# Patient Record
Sex: FEMALE | Race: WHITE | NOT HISPANIC OR LATINO | Employment: OTHER | URBAN - METROPOLITAN AREA
[De-identification: names, ages, dates, MRNs, and addresses within clinical notes are randomized per-mention and may not be internally consistent; named-entity substitution may affect disease eponyms.]

---

## 2017-01-01 ENCOUNTER — APPOINTMENT (OUTPATIENT)
Dept: GENERAL RADIOLOGY | Facility: HOSPITAL | Age: 82
End: 2017-01-01

## 2017-01-01 ENCOUNTER — APPOINTMENT (OUTPATIENT)
Dept: CT IMAGING | Facility: HOSPITAL | Age: 82
End: 2017-01-01

## 2017-01-01 ENCOUNTER — HOSPITAL ENCOUNTER (INPATIENT)
Facility: HOSPITAL | Age: 82
LOS: 1 days | End: 2017-07-20
Attending: EMERGENCY MEDICINE | Admitting: HOSPITALIST

## 2017-01-01 ENCOUNTER — HOSPITAL ENCOUNTER (INPATIENT)
Facility: HOSPITAL | Age: 82
LOS: 4 days | End: 2017-07-24
Attending: FAMILY MEDICINE | Admitting: FAMILY MEDICINE

## 2017-01-01 VITALS
TEMPERATURE: 98.1 F | HEART RATE: 81 BPM | RESPIRATION RATE: 22 BRPM | DIASTOLIC BLOOD PRESSURE: 67 MMHG | SYSTOLIC BLOOD PRESSURE: 153 MMHG

## 2017-01-01 VITALS
RESPIRATION RATE: 16 BRPM | SYSTOLIC BLOOD PRESSURE: 147 MMHG | TEMPERATURE: 97.9 F | HEART RATE: 93 BPM | WEIGHT: 113.7 LBS | DIASTOLIC BLOOD PRESSURE: 86 MMHG | OXYGEN SATURATION: 96 % | HEIGHT: 65 IN | BODY MASS INDEX: 18.94 KG/M2

## 2017-01-01 DIAGNOSIS — I63.512 ACUTE ISCHEMIC LEFT MCA STROKE (HCC): Primary | ICD-10-CM

## 2017-01-01 DIAGNOSIS — R40.2431 GLASGOW COMA SCALE TOTAL SCORE 3-8, IN THE FIELD (EMT OR AMBULANCE) (HCC): ICD-10-CM

## 2017-01-01 DIAGNOSIS — I10 UNCONTROLLED HYPERTENSION: ICD-10-CM

## 2017-01-01 LAB
ALT SERPL W P-5'-P-CCNC: 14 U/L (ref 7–40)
ANION GAP SERPL CALCULATED.3IONS-SCNC: 7 MMOL/L (ref 3–11)
APTT PPP: <24 SECONDS (ref 24–31)
ARTICHOKE IGE QN: 75 MG/DL (ref 0–130)
AST SERPL-CCNC: 22 U/L (ref 0–33)
BASOPHILS # BLD AUTO: 0.05 10*3/MM3 (ref 0–0.2)
BASOPHILS NFR BLD AUTO: 0.7 % (ref 0–1)
BUN BLD-MCNC: 27 MG/DL (ref 9–23)
BUN BLDA-MCNC: 26 MG/DL (ref 8–26)
BUN/CREAT SERPL: 19.3 (ref 7–25)
CA-I BLDA-SCNC: 1.32 MMOL/L (ref 1.2–1.32)
CALCIUM SPEC-SCNC: 9.7 MG/DL (ref 8.7–10.4)
CHLORIDE BLDA-SCNC: 107 MMOL/L (ref 98–109)
CHLORIDE SERPL-SCNC: 112 MMOL/L (ref 99–109)
CHOLEST SERPL-MCNC: 129 MG/DL (ref 0–200)
CO2 BLDA-SCNC: 24 MMOL/L (ref 24–29)
CO2 SERPL-SCNC: 24 MMOL/L (ref 20–31)
CREAT BLD-MCNC: 1.4 MG/DL (ref 0.6–1.3)
CREAT BLDA-MCNC: 1.3 MG/DL (ref 0.6–1.3)
DEPRECATED RDW RBC AUTO: 48.3 FL (ref 37–54)
DEPRECATED RDW RBC AUTO: 49 FL (ref 37–54)
EOSINOPHIL # BLD AUTO: 0.29 10*3/MM3 (ref 0–0.3)
EOSINOPHIL NFR BLD AUTO: 3.8 % (ref 0–3)
ERYTHROCYTE [DISTWIDTH] IN BLOOD BY AUTOMATED COUNT: 14 % (ref 11.3–14.5)
ERYTHROCYTE [DISTWIDTH] IN BLOOD BY AUTOMATED COUNT: 14.2 % (ref 11.3–14.5)
GFR SERPL CREATININE-BSD FRML MDRD: 35 ML/MIN/1.73
GLUCOSE BLD-MCNC: 135 MG/DL (ref 70–100)
GLUCOSE BLDC GLUCOMTR-MCNC: 158 MG/DL (ref 70–130)
GLUCOSE BLDC GLUCOMTR-MCNC: 162 MG/DL (ref 70–130)
HBA1C MFR BLD: 5.9 % (ref 4.8–5.6)
HCT VFR BLD AUTO: 36.2 % (ref 34.5–44)
HCT VFR BLD AUTO: 39.6 % (ref 34.5–44)
HCT VFR BLDA CALC: 40 % (ref 38–51)
HDLC SERPL-MCNC: 31 MG/DL (ref 40–60)
HGB BLD-MCNC: 11.8 G/DL (ref 11.5–15.5)
HGB BLD-MCNC: 12.8 G/DL (ref 11.5–15.5)
HGB BLDA-MCNC: 13.6 G/DL (ref 12–17)
HOLD SPECIMEN: NORMAL
HOLD SPECIMEN: NORMAL
IMM GRANULOCYTES # BLD: 0.01 10*3/MM3 (ref 0–0.03)
IMM GRANULOCYTES NFR BLD: 0.1 % (ref 0–0.6)
INR PPP: 1 (ref 0.8–1.2)
LYMPHOCYTES # BLD AUTO: 1.67 10*3/MM3 (ref 0.6–4.8)
LYMPHOCYTES NFR BLD AUTO: 21.9 % (ref 24–44)
MCH RBC QN AUTO: 30.5 PG (ref 27–31)
MCH RBC QN AUTO: 31.5 PG (ref 27–31)
MCHC RBC AUTO-ENTMCNC: 32.3 G/DL (ref 32–36)
MCHC RBC AUTO-ENTMCNC: 32.6 G/DL (ref 32–36)
MCV RBC AUTO: 94.5 FL (ref 80–99)
MCV RBC AUTO: 96.5 FL (ref 80–99)
MONOCYTES # BLD AUTO: 0.43 10*3/MM3 (ref 0–1)
MONOCYTES NFR BLD AUTO: 5.6 % (ref 0–12)
NEUTROPHILS # BLD AUTO: 5.19 10*3/MM3 (ref 1.5–8.3)
NEUTROPHILS NFR BLD AUTO: 67.9 % (ref 41–71)
PLATELET # BLD AUTO: 293 10*3/MM3 (ref 150–450)
PLATELET # BLD AUTO: 336 10*3/MM3 (ref 150–450)
PMV BLD AUTO: 11.1 FL (ref 6–12)
PMV BLD AUTO: 11.2 FL (ref 6–12)
POTASSIUM BLD-SCNC: 4.9 MMOL/L (ref 3.5–5.5)
POTASSIUM BLDA-SCNC: 5.3 MMOL/L (ref 3.5–4.9)
PROTHROMBIN TIME: 11.7 SECONDS (ref 12.8–15.2)
RBC # BLD AUTO: 3.75 10*6/MM3 (ref 3.89–5.14)
RBC # BLD AUTO: 4.19 10*6/MM3 (ref 3.89–5.14)
SODIUM BLD-SCNC: 143 MMOL/L (ref 132–146)
SODIUM BLDA-SCNC: 143 MMOL/L (ref 138–146)
TRIGL SERPL-MCNC: 124 MG/DL (ref 0–150)
TROPONIN I SERPL-MCNC: 0.02 NG/ML (ref 0–0.07)
WBC NRBC COR # BLD: 7.64 10*3/MM3 (ref 3.5–10.8)
WBC NRBC COR # BLD: 9.16 10*3/MM3 (ref 3.5–10.8)
WHOLE BLOOD HOLD SPECIMEN: NORMAL
WHOLE BLOOD HOLD SPECIMEN: NORMAL

## 2017-01-01 PROCEDURE — 85730 THROMBOPLASTIN TIME PARTIAL: CPT | Performed by: EMERGENCY MEDICINE

## 2017-01-01 PROCEDURE — 0 IOPAMIDOL PER 1 ML: Performed by: EMERGENCY MEDICINE

## 2017-01-01 PROCEDURE — 25010000002 MORPHINE SULFATE (PF) 2 MG/ML SOLUTION: Performed by: INTERNAL MEDICINE

## 2017-01-01 PROCEDURE — 99223 1ST HOSP IP/OBS HIGH 75: CPT | Performed by: HOSPITALIST

## 2017-01-01 PROCEDURE — 99285 EMERGENCY DEPT VISIT HI MDM: CPT

## 2017-01-01 PROCEDURE — 70450 CT HEAD/BRAIN W/O DYE: CPT

## 2017-01-01 PROCEDURE — 99231 SBSQ HOSP IP/OBS SF/LOW 25: CPT | Performed by: PSYCHIATRY & NEUROLOGY

## 2017-01-01 PROCEDURE — 84450 TRANSFERASE (AST) (SGOT): CPT | Performed by: EMERGENCY MEDICINE

## 2017-01-01 PROCEDURE — G0378 HOSPITAL OBSERVATION PER HR: HCPCS

## 2017-01-01 PROCEDURE — 85027 COMPLETE CBC AUTOMATED: CPT | Performed by: NURSE PRACTITIONER

## 2017-01-01 PROCEDURE — 84484 ASSAY OF TROPONIN QUANT: CPT

## 2017-01-01 PROCEDURE — 99221 1ST HOSP IP/OBS SF/LOW 40: CPT | Performed by: NURSE PRACTITIONER

## 2017-01-01 PROCEDURE — 85025 COMPLETE CBC W/AUTO DIFF WBC: CPT | Performed by: EMERGENCY MEDICINE

## 2017-01-01 PROCEDURE — 85014 HEMATOCRIT: CPT

## 2017-01-01 PROCEDURE — 99223 1ST HOSP IP/OBS HIGH 75: CPT | Performed by: PSYCHIATRY & NEUROLOGY

## 2017-01-01 PROCEDURE — 99239 HOSP IP/OBS DSCHRG MGMT >30: CPT | Performed by: INTERNAL MEDICINE

## 2017-01-01 PROCEDURE — 0042T HC CT CEREBRAL PERFUSION W/WO CONTRAST: CPT

## 2017-01-01 PROCEDURE — 80047 BASIC METABLC PNL IONIZED CA: CPT

## 2017-01-01 PROCEDURE — 80061 LIPID PANEL: CPT | Performed by: NURSE PRACTITIONER

## 2017-01-01 PROCEDURE — 80048 BASIC METABOLIC PNL TOTAL CA: CPT | Performed by: NURSE PRACTITIONER

## 2017-01-01 PROCEDURE — 93005 ELECTROCARDIOGRAM TRACING: CPT | Performed by: EMERGENCY MEDICINE

## 2017-01-01 PROCEDURE — 70496 CT ANGIOGRAPHY HEAD: CPT

## 2017-01-01 PROCEDURE — 83036 HEMOGLOBIN GLYCOSYLATED A1C: CPT | Performed by: NURSE PRACTITIONER

## 2017-01-01 PROCEDURE — 85610 PROTHROMBIN TIME: CPT

## 2017-01-01 PROCEDURE — 82962 GLUCOSE BLOOD TEST: CPT

## 2017-01-01 PROCEDURE — 71010 HC CHEST PA OR AP: CPT

## 2017-01-01 PROCEDURE — 84460 ALANINE AMINO (ALT) (SGPT): CPT | Performed by: EMERGENCY MEDICINE

## 2017-01-01 PROCEDURE — 70498 CT ANGIOGRAPHY NECK: CPT

## 2017-01-01 RX ORDER — MORPHINE SULFATE 2 MG/ML
2 INJECTION, SOLUTION INTRAMUSCULAR; INTRAVENOUS
Status: DISCONTINUED | OUTPATIENT
Start: 2017-01-01 | End: 2017-01-01 | Stop reason: HOSPADM

## 2017-01-01 RX ORDER — GLYCOPYRROLATE 0.2 MG/ML
0.4 INJECTION INTRAMUSCULAR; INTRAVENOUS EVERY 4 HOURS
Status: DISCONTINUED | OUTPATIENT
Start: 2017-01-01 | End: 2017-01-01 | Stop reason: HOSPADM

## 2017-01-01 RX ORDER — GLYCOPYRROLATE 0.2 MG/ML
0.4 INJECTION INTRAMUSCULAR; INTRAVENOUS EVERY 8 HOURS
Status: DISCONTINUED | OUTPATIENT
Start: 2017-01-01 | End: 2017-01-01

## 2017-01-01 RX ORDER — MINERAL OIL AND WHITE PETROLATUM 150; 830 MG/G; MG/G
OINTMENT OPHTHALMIC 3 TIMES DAILY
Status: CANCELLED | OUTPATIENT
Start: 2017-01-01

## 2017-01-01 RX ORDER — LORAZEPAM 2 MG/ML
2 INJECTION INTRAMUSCULAR
Status: CANCELLED | OUTPATIENT
Start: 2017-01-01 | End: 2017-07-30

## 2017-01-01 RX ORDER — LEVOTHYROXINE SODIUM 175 UG/1
175 TABLET ORAL
COMMUNITY

## 2017-01-01 RX ORDER — ATROPINE SULFATE 10 MG/ML
2 SOLUTION/ DROPS OPHTHALMIC
Status: DISCONTINUED | OUTPATIENT
Start: 2017-01-01 | End: 2017-01-01 | Stop reason: HOSPADM

## 2017-01-01 RX ORDER — ACETAMINOPHEN 500 MG
500 TABLET ORAL EVERY 6 HOURS PRN
COMMUNITY

## 2017-01-01 RX ORDER — ACETAMINOPHEN 650 MG/1
650 SUPPOSITORY RECTAL EVERY 4 HOURS PRN
Status: DISCONTINUED | OUTPATIENT
Start: 2017-01-01 | End: 2017-01-01 | Stop reason: HOSPADM

## 2017-01-01 RX ORDER — SODIUM CHLORIDE 0.9 % (FLUSH) 0.9 %
10 SYRINGE (ML) INJECTION AS NEEDED
Status: CANCELLED | OUTPATIENT
Start: 2017-01-01

## 2017-01-01 RX ORDER — ONDANSETRON 4 MG/1
4 TABLET, FILM COATED ORAL EVERY 6 HOURS PRN
COMMUNITY

## 2017-01-01 RX ORDER — SOTALOL HYDROCHLORIDE 80 MG/1
40 TABLET ORAL 2 TIMES DAILY
COMMUNITY
End: 2017-01-01

## 2017-01-01 RX ORDER — SODIUM CHLORIDE 0.9 % (FLUSH) 0.9 %
10 SYRINGE (ML) INJECTION AS NEEDED
Status: DISCONTINUED | OUTPATIENT
Start: 2017-01-01 | End: 2017-01-01 | Stop reason: HOSPADM

## 2017-01-01 RX ORDER — MINERAL OIL AND WHITE PETROLATUM 150; 830 MG/G; MG/G
OINTMENT OPHTHALMIC 3 TIMES DAILY
Status: DISCONTINUED | OUTPATIENT
Start: 2017-01-01 | End: 2017-01-01 | Stop reason: HOSPADM

## 2017-01-01 RX ORDER — GLYCOPYRROLATE 0.2 MG/ML
0.4 INJECTION INTRAMUSCULAR; INTRAVENOUS EVERY 6 HOURS PRN
Status: DISCONTINUED | OUTPATIENT
Start: 2017-01-01 | End: 2017-01-01 | Stop reason: HOSPADM

## 2017-01-01 RX ORDER — SODIUM CHLORIDE 0.9 % (FLUSH) 0.9 %
1-10 SYRINGE (ML) INJECTION AS NEEDED
Status: DISCONTINUED | OUTPATIENT
Start: 2017-01-01 | End: 2017-01-01 | Stop reason: HOSPADM

## 2017-01-01 RX ORDER — MORPHINE SULFATE 2 MG/ML
2 INJECTION, SOLUTION INTRAMUSCULAR; INTRAVENOUS
Status: CANCELLED | OUTPATIENT
Start: 2017-01-01 | End: 2017-07-29

## 2017-01-01 RX ORDER — POTASSIUM CHLORIDE 750 MG/1
20 CAPSULE, EXTENDED RELEASE ORAL DAILY
COMMUNITY

## 2017-01-01 RX ORDER — IPRATROPIUM BROMIDE AND ALBUTEROL SULFATE 2.5; .5 MG/3ML; MG/3ML
3 SOLUTION RESPIRATORY (INHALATION) EVERY 6 HOURS PRN
COMMUNITY

## 2017-01-01 RX ORDER — ATORVASTATIN CALCIUM 40 MG/1
40 TABLET, FILM COATED ORAL NIGHTLY
COMMUNITY

## 2017-01-01 RX ORDER — GLYCOPYRROLATE 0.2 MG/ML
0.2 INJECTION INTRAMUSCULAR; INTRAVENOUS EVERY 4 HOURS PRN
Status: DISCONTINUED | OUTPATIENT
Start: 2017-01-01 | End: 2017-01-01

## 2017-01-01 RX ORDER — SCOLOPAMINE TRANSDERMAL SYSTEM 1 MG/1
1 PATCH, EXTENDED RELEASE TRANSDERMAL
Status: DISCONTINUED | OUTPATIENT
Start: 2017-01-01 | End: 2017-01-01 | Stop reason: HOSPADM

## 2017-01-01 RX ORDER — LORAZEPAM 2 MG/ML
2 INJECTION INTRAMUSCULAR
Status: DISCONTINUED | OUTPATIENT
Start: 2017-01-01 | End: 2017-01-01 | Stop reason: HOSPADM

## 2017-01-01 RX ORDER — BUSPIRONE HYDROCHLORIDE 5 MG/1
5 TABLET ORAL EVERY MORNING
COMMUNITY

## 2017-01-01 RX ORDER — GLYCOPYRROLATE 0.2 MG/ML
0.4 INJECTION INTRAMUSCULAR; INTRAVENOUS EVERY 8 HOURS
Status: DISCONTINUED | OUTPATIENT
Start: 2017-01-01 | End: 2017-01-01 | Stop reason: HOSPADM

## 2017-01-01 RX ORDER — LISINOPRIL 10 MG/1
10 TABLET ORAL 2 TIMES DAILY
COMMUNITY

## 2017-01-01 RX ORDER — SODIUM CHLORIDE 0.9 % (FLUSH) 0.9 %
1-10 SYRINGE (ML) INJECTION AS NEEDED
Status: CANCELLED | OUTPATIENT
Start: 2017-01-01

## 2017-01-01 RX ORDER — LORAZEPAM 2 MG/ML
0.5 INJECTION INTRAMUSCULAR EVERY 4 HOURS PRN
Status: DISCONTINUED | OUTPATIENT
Start: 2017-01-01 | End: 2017-01-01 | Stop reason: HOSPADM

## 2017-01-01 RX ORDER — FAMOTIDINE 20 MG/1
20 TABLET, FILM COATED ORAL NIGHTLY
COMMUNITY

## 2017-01-01 RX ORDER — GLYCOPYRROLATE 0.2 MG/ML
0.4 INJECTION INTRAMUSCULAR; INTRAVENOUS EVERY 8 HOURS
Status: CANCELLED | OUTPATIENT
Start: 2017-01-01

## 2017-01-01 RX ORDER — BISACODYL 10 MG
10 SUPPOSITORY, RECTAL RECTAL ONCE AS NEEDED
COMMUNITY

## 2017-01-01 RX ORDER — BISACODYL 10 MG
10 SUPPOSITORY, RECTAL RECTAL DAILY PRN
Status: DISCONTINUED | OUTPATIENT
Start: 2017-01-01 | End: 2017-01-01 | Stop reason: HOSPADM

## 2017-01-01 RX ORDER — SOTALOL HYDROCHLORIDE 80 MG/1
40 TABLET ORAL 2 TIMES DAILY
COMMUNITY

## 2017-01-01 RX ORDER — LORAZEPAM 2 MG/ML
0.5 INJECTION INTRAMUSCULAR EVERY 4 HOURS PRN
Status: CANCELLED | OUTPATIENT
Start: 2017-01-01 | End: 2017-07-29

## 2017-01-01 RX ORDER — ACETAMINOPHEN 650 MG/1
650 SUPPOSITORY RECTAL EVERY 4 HOURS PRN
Status: CANCELLED | OUTPATIENT
Start: 2017-01-01

## 2017-01-01 RX ORDER — GLYCOPYRROLATE 0.2 MG/ML
0.4 INJECTION INTRAMUSCULAR; INTRAVENOUS EVERY 6 HOURS PRN
Status: CANCELLED | OUTPATIENT
Start: 2017-01-01

## 2017-01-01 RX ORDER — LANOLIN ALCOHOL/MO/W.PET/CERES
1.5 CREAM (GRAM) TOPICAL NIGHTLY PRN
COMMUNITY

## 2017-01-01 RX ADMIN — MINERAL OIL AND WHITE PETROLATUM: 150; 830 OINTMENT OPHTHALMIC at 20:34

## 2017-01-01 RX ADMIN — Medication 3 ML: at 17:11

## 2017-01-01 RX ADMIN — GLYCOPYRROLATE 0.4 MG: 0.2 INJECTION, SOLUTION INTRAMUSCULAR; INTRAVENOUS at 17:10

## 2017-01-01 RX ADMIN — GLYCOPYRROLATE 0.4 MG: 0.2 INJECTION, SOLUTION INTRAMUSCULAR; INTRAVENOUS at 12:37

## 2017-01-01 RX ADMIN — MINERAL OIL AND WHITE PETROLATUM: 150; 830 OINTMENT OPHTHALMIC at 21:01

## 2017-01-01 RX ADMIN — GLYCOPYRROLATE 0.4 MG: 0.2 INJECTION, SOLUTION INTRAMUSCULAR; INTRAVENOUS at 17:56

## 2017-01-01 RX ADMIN — SCOPALAMINE 1 PATCH: 1 PATCH, EXTENDED RELEASE TRANSDERMAL at 12:37

## 2017-01-01 RX ADMIN — IOPAMIDOL 125 ML: 755 INJECTION, SOLUTION INTRAVENOUS at 13:09

## 2017-01-01 RX ADMIN — MINERAL OIL AND WHITE PETROLATUM: 150; 830 OINTMENT OPHTHALMIC at 17:10

## 2017-01-01 RX ADMIN — GLYCOPYRROLATE 0.4 MG: 0.2 INJECTION, SOLUTION INTRAMUSCULAR; INTRAVENOUS at 05:20

## 2017-01-01 RX ADMIN — GLYCOPYRROLATE 0.4 MG: 0.2 INJECTION, SOLUTION INTRAMUSCULAR; INTRAVENOUS at 00:20

## 2017-01-01 RX ADMIN — Medication: at 10:03

## 2017-01-01 RX ADMIN — GLYCOPYRROLATE 0.4 MG: 0.2 INJECTION, SOLUTION INTRAMUSCULAR; INTRAVENOUS at 12:18

## 2017-01-01 RX ADMIN — GLYCOPYRROLATE 0.2 MG: 0.2 INJECTION, SOLUTION INTRAMUSCULAR; INTRAVENOUS at 08:36

## 2017-01-01 RX ADMIN — MINERAL OIL AND WHITE PETROLATUM: 150; 830 OINTMENT OPHTHALMIC at 10:03

## 2017-01-01 RX ADMIN — MINERAL OIL AND WHITE PETROLATUM: 150; 830 OINTMENT OPHTHALMIC at 09:36

## 2017-01-01 RX ADMIN — GLYCOPYRROLATE 0.4 MG: 0.2 INJECTION, SOLUTION INTRAMUSCULAR; INTRAVENOUS at 22:31

## 2017-01-01 RX ADMIN — MINERAL OIL AND WHITE PETROLATUM: 150; 830 OINTMENT OPHTHALMIC at 17:57

## 2017-01-01 RX ADMIN — GLYCOPYRROLATE 0.4 MG: 0.2 INJECTION, SOLUTION INTRAMUSCULAR; INTRAVENOUS at 20:33

## 2017-01-01 RX ADMIN — GLYCOPYRROLATE 0.4 MG: 0.2 INJECTION, SOLUTION INTRAMUSCULAR; INTRAVENOUS at 08:24

## 2017-01-01 RX ADMIN — MINERAL OIL AND WHITE PETROLATUM: 150; 830 OINTMENT OPHTHALMIC at 09:34

## 2017-01-01 RX ADMIN — GLYCOPYRROLATE 0.4 MG: 0.2 INJECTION, SOLUTION INTRAMUSCULAR; INTRAVENOUS at 16:20

## 2017-01-01 RX ADMIN — GLYCOPYRROLATE 0.4 MG: 0.2 INJECTION, SOLUTION INTRAMUSCULAR; INTRAVENOUS at 21:01

## 2017-01-01 RX ADMIN — Medication: at 17:57

## 2017-01-01 RX ADMIN — GLYCOPYRROLATE 0.4 MG: 0.2 INJECTION, SOLUTION INTRAMUSCULAR; INTRAVENOUS at 04:51

## 2017-01-01 RX ADMIN — GLYCOPYRROLATE 0.2 MG: 0.2 INJECTION, SOLUTION INTRAMUSCULAR; INTRAVENOUS at 03:13

## 2017-01-01 RX ADMIN — GLYCOPYRROLATE 0.4 MG: 0.2 INJECTION, SOLUTION INTRAMUSCULAR; INTRAVENOUS at 05:07

## 2017-01-01 RX ADMIN — GLYCOPYRROLATE 0.4 MG: 0.2 INJECTION, SOLUTION INTRAMUSCULAR; INTRAVENOUS at 00:28

## 2017-01-01 RX ADMIN — MORPHINE SULFATE 2 MG: 2 INJECTION, SOLUTION INTRAMUSCULAR; INTRAVENOUS at 02:04

## 2017-01-01 RX ADMIN — GLYCOPYRROLATE 0.4 MG: 0.2 INJECTION, SOLUTION INTRAMUSCULAR; INTRAVENOUS at 09:34

## 2017-01-01 RX ADMIN — GLYCOPYRROLATE 0.4 MG: 0.2 INJECTION, SOLUTION INTRAMUSCULAR; INTRAVENOUS at 01:01

## 2017-01-01 RX ADMIN — MINERAL OIL AND WHITE PETROLATUM: 150; 830 OINTMENT OPHTHALMIC at 20:33

## 2017-01-01 RX ADMIN — MORPHINE SULFATE 2 MG: 2 INJECTION, SOLUTION INTRAMUSCULAR; INTRAVENOUS at 22:35

## 2017-01-01 RX ADMIN — MINERAL OIL AND WHITE PETROLATUM: 150; 830 OINTMENT OPHTHALMIC at 14:50

## 2017-01-01 RX ADMIN — MINERAL OIL AND WHITE PETROLATUM 1 APPLICATION: 150; 830 OINTMENT OPHTHALMIC at 16:20

## 2017-01-01 RX ADMIN — GLYCOPYRROLATE 0.4 MG: 0.2 INJECTION, SOLUTION INTRAMUSCULAR; INTRAVENOUS at 14:50

## 2017-01-01 RX ADMIN — MINERAL OIL AND WHITE PETROLATUM: 150; 830 OINTMENT OPHTHALMIC at 20:29

## 2017-01-01 RX ADMIN — GLYCOPYRROLATE 0.4 MG: 0.2 INJECTION, SOLUTION INTRAMUSCULAR; INTRAVENOUS at 20:34

## 2017-01-01 RX ADMIN — GLYCOPYRROLATE 0.4 MG: 0.2 INJECTION, SOLUTION INTRAMUSCULAR; INTRAVENOUS at 21:33

## 2017-01-01 RX ADMIN — GLYCOPYRROLATE 0.4 MG: 0.2 INJECTION, SOLUTION INTRAMUSCULAR; INTRAVENOUS at 12:59

## 2017-01-01 RX ADMIN — MORPHINE SULFATE 2 MG: 2 INJECTION, SOLUTION INTRAMUSCULAR; INTRAVENOUS at 00:20

## 2017-07-19 PROBLEM — I63.512 ACUTE ISCHEMIC LEFT MCA STROKE (HCC): Status: ACTIVE | Noted: 2017-01-01

## 2017-07-19 PROBLEM — F03.90 DEMENTIA (HCC): Status: ACTIVE | Noted: 2017-01-01

## 2017-07-19 NOTE — ED PROVIDER NOTES
Subjective   HPI Comments: The patient was brought to the ED by EMS after nursing home staff noted severe altered mental status and unresponsive state. Nursing home staff note that the patient was unable to swallow pills at 0900 this morning which is abnormal for her. Her last knwn normal is some time last night, however, there is no exact time at this point. EMS report that the patient has been flaccid throughout her entire body with the exception of her left upper extremity, which demostrates decorticate-like spasms  There is a reported prior CVA.     Patient is a 89 y.o. female presenting with altered mental status.   History provided by:  EMS personnel and nursing home  History limited by:  Mental status change  Altered Mental Status   Presenting symptoms: behavior changes and unresponsiveness    Severity:  Severe  Most recent episode:  Today  Episode history:  Unable to specify  Timing:  Constant  Progression:  Worsening  Chronicity:  New  Context: nursing home resident    Associated symptoms: weakness (diffuse with excption of LUE)        Review of Systems   Unable to perform ROS: Mental status change   HENT: Positive for trouble swallowing (unable to swallow pills this morning at 0900).    Neurological: Positive for weakness (diffuse with excption of LUE).        Unresponsive state and severe altered mental status       Past Medical History:   Diagnosis Date   • Anxiety    • Arthritis    • Atrial fibrillation    • Dementia    • Depression    • Disease of thyroid gland    • Hyperlipidemia    • Hypertension    • Stroke    • TIA (transient ischemic attack)        No Known Allergies    History reviewed. No pertinent surgical history.    History reviewed. No pertinent family history.    Social History     Social History   • Marital status:      Spouse name: N/A   • Number of children: N/A   • Years of education: N/A     Social History Main Topics   • Smoking status: Never Smoker   • Smokeless tobacco: None    • Alcohol use No   • Drug use: No   • Sexual activity: Defer     Other Topics Concern   • None     Social History Narrative   • None         Objective   Physical Exam   Constitutional: She appears well-developed and well-nourished. No distress.   Severe altered mental status   HENT:   Head: Atraumatic.   Nose: Nose normal.   Mouth/Throat: Oropharynx is clear and moist.   Eyes: No scleral icterus.   Eyes closed except in response to moving the pt as well as deep painful stimulus. Pts eyes do not cross midline to the right. Left pupil 2mm, right pupil 3mm.   Neck: Neck supple.   Cardiovascular: Normal rate, regular rhythm and normal heart sounds.    No murmur heard.  Pulmonary/Chest: Effort normal. No respiratory distress. She has rales (Course rhonchi in all lung fields anteriorly.).   Abdominal: Soft. Bowel sounds are normal. She exhibits no mass. There is no tenderness. There is no rebound and no guarding.   Musculoskeletal: She exhibits no edema (no peripheral edema).   No movements noted in lower extremities.   Neurological:   Deep painful stimulis leads to decorticate-like posturing, left greater than right. Nonverbal, not follwing any commands.   Skin: Skin is warm and dry.   Nursing note and vitals reviewed.      Critical Care  Performed by: KEELY MONTAÑO  Authorized by: KEELY MONTAÑO   Total critical care time: 35 minutes  Critical care time was exclusive of separately billable procedures and treating other patients.  Critical care was necessary to treat or prevent imminent or life-threatening deterioration of the following conditions: circulatory failure and CNS failure or compromise.  Critical care was time spent personally by me on the following activities: discussions with consultants, evaluation of patient's response to treatment, obtaining history from patient or surrogate, ordering and review of laboratory studies, pulse oximetry, re-evaluation of patient's condition, ordering and review of  radiographic studies, ordering and performing treatments and interventions, examination of patient, development of treatment plan with patient or surrogate and review of old charts.               ED Course  ED Course   Comment By Time   Spoke with José Miguel Recio, pt's son, he wants to make sure that there will be no aggressive measures and requests a DNR. His phone number is [phone] Cyndi Oh 07/19 1337   Discussed case in detail with Dr. Lambert -YENI Oh 07/19 1346   Paged hospitalist, Dr. Rasheed, and intensivist, Dr. Barreto, on call -YENI Oh 07/19 1355   Spoke with Dr. Rasheed, hospital on call. Discussed acute MCA CVA and coma. Pt's son agrees with admission for observation but does not want any aggressive measures. Current blood pressure is 197/80. All are agreeable with the plan to admit. -YENI Hanna Oh 07/19 1411   Spoke with Loida Hemal, discussed presence of very large, acute blood clot on the right side affecting the left side of the body. Loida agrees with non-aggressive measures. She would like to wait for her other siblings to arrive in the ED prior to making a medica decision moving forward. -MAS Cyndi Oh 07/19 1441     Recent Results (from the past 24 hour(s))   POC Protime / INR    Collection Time: 07/19/17 12:42 PM   Result Value Ref Range    Protime 11.7 (L) 12.8 - 15.2 seconds    INR 1.0 0.8 - 1.2   aPTT    Collection Time: 07/19/17 12:44 PM   Result Value Ref Range    PTT <24.0 (L) 24.0 - 31.0 seconds   AST    Collection Time: 07/19/17 12:44 PM   Result Value Ref Range    AST (SGOT) 22 0 - 33 U/L   ALT    Collection Time: 07/19/17 12:44 PM   Result Value Ref Range    ALT (SGPT) 14 7 - 40 U/L   Light Blue Top    Collection Time: 07/19/17 12:44 PM   Result Value Ref Range    Extra Tube hold for add-on    Green Top (Gel)    Collection Time: 07/19/17 12:44 PM   Result Value Ref Range    Extra Tube Hold for add-ons.    Lavender Top    Collection Time:  07/19/17 12:44 PM   Result Value Ref Range    Extra Tube hold for add-on    Gold Top - SST    Collection Time: 07/19/17 12:44 PM   Result Value Ref Range    Extra Tube Hold for add-ons.    CBC Auto Differential    Collection Time: 07/19/17 12:44 PM   Result Value Ref Range    WBC 7.64 3.50 - 10.80 10*3/mm3    RBC 4.19 3.89 - 5.14 10*6/mm3    Hemoglobin 12.8 11.5 - 15.5 g/dL    Hematocrit 39.6 34.5 - 44.0 %    MCV 94.5 80.0 - 99.0 fL    MCH 30.5 27.0 - 31.0 pg    MCHC 32.3 32.0 - 36.0 g/dL    RDW 14.0 11.3 - 14.5 %    RDW-SD 48.3 37.0 - 54.0 fl    MPV 11.1 6.0 - 12.0 fL    Platelets 336 150 - 450 10*3/mm3    Neutrophil % 67.9 41.0 - 71.0 %    Lymphocyte % 21.9 (L) 24.0 - 44.0 %    Monocyte % 5.6 0.0 - 12.0 %    Eosinophil % 3.8 (H) 0.0 - 3.0 %    Basophil % 0.7 0.0 - 1.0 %    Immature Grans % 0.1 0.0 - 0.6 %    Neutrophils, Absolute 5.19 1.50 - 8.30 10*3/mm3    Lymphocytes, Absolute 1.67 0.60 - 4.80 10*3/mm3    Monocytes, Absolute 0.43 0.00 - 1.00 10*3/mm3    Eosinophils, Absolute 0.29 0.00 - 0.30 10*3/mm3    Basophils, Absolute 0.05 0.00 - 0.20 10*3/mm3    Immature Grans, Absolute 0.01 0.00 - 0.03 10*3/mm3   POC CHEM 8    Collection Time: 07/19/17 12:46 PM   Result Value Ref Range    Glucose 162 (H) 70 - 130 mg/dL    BUN, Arterial 26 8 - 26 mg/dL    Creatinine 1.30 0.60 - 1.30 mg/dL    Sodium 143 138 - 146 mmol/L    Potassium 5.3 (H) 3.5 - 4.9 mmol/L    Chloride 107 98 - 109 mmol/L    Total CO2 24 24 - 29 mmol/L    Hemoglobin 13.6 12.0 - 17.0 g/dL    Hematocrit 40 38 - 51 %    Ionized Calcium 1.32 1.20 - 1.32 mmol/L   POC Troponin, Rapid    Collection Time: 07/19/17 12:50 PM   Result Value Ref Range    Troponin I 0.02 0.00 - 0.07 ng/mL     Note: In addition to lab results from this visit, the labs listed above may include labs taken at another facility or during a different encounter within the last 24 hours. Please correlate lab times with ED admission and discharge times for further clarification of the services  performed during this visit.    CT Angiogram Neck With & Without Contrast   Preliminary Result   No evidence of hemodynamically significant carotid or   vertebral stenosis in the neck.       D:  07/19/2017   E:  07/19/2017          CT Angiogram Head With & Without Contrast   Preliminary Result   Truncated left middle cerebral artery, and marked paucity of   left MCA territory vascularity.       D:  07/19/2017   E:  07/19/2017          CT Cerebral Perfusion With & Without Contrast   Preliminary Result   1. Old right MCA territory infarct.   2. Large acute left MCA territory infarct. Little evidence to suggest   significant recoverable tissue.       D:  07/19/2017   E:  07/19/2017          CT Head Without Contrast Stroke Protocol    (Results Pending)   XR Chest 1 View    (Results Pending)     Vitals:    07/19/17 1515 07/19/17 1530 07/19/17 1545 07/19/17 1600   BP: 179/92 174/84 178/96 (!) 174/116   BP Location:       Patient Position:       Pulse: 66 69 68 69   Resp:       Temp:       TempSrc:       SpO2: 96% 98% 98% 96%   Weight:       Height:         Medications   sodium chloride 0.9 % flush 10 mL (not administered)   iopamidol (ISOVUE-370) 76 % injection 150 mL (125 mL Intravenous Given 7/19/17 1309)     ECG/EMG Results (last 24 hours)     Procedure Component Value Units Date/Time    ECG 12 Lead [[id number]] Collected:  07/19/17 1311     Updated:  07/19/17 1316                       MDM  Number of Diagnoses or Management Options     Amount and/or Complexity of Data Reviewed  Clinical lab tests: reviewed  Tests in the radiology section of CPT®: reviewed  Tests in the medicine section of CPT®: reviewed    Critical Care  Total time providing critical care: 30-74 minutes      Final diagnoses:   Acute ischemic left MCA stroke   Jenny coma scale total score 3-8, in the field (EMT or ambulance)       Documentation assistance provided by ramya Oh.  Information recorded by the ramya was done at my direction  and has been verified and validated by me.     Cyndi Oh  07/19/17 1330       Cyndi Oh  07/19/17 [id number]       José Miguel Barragan MD  07/19/17 [id number]

## 2017-07-19 NOTE — SIGNIFICANT NOTE
Dr. Tram Hernandez, RN, PN  Comfort Care Only, Allow natural death, make sure we address symptoms and keep her comfortable.

## 2017-07-19 NOTE — CONSULTS
NAME: COLUMBA CONTI  : 1925  PCP: Silvestre Coleman MD  Attending MD: Kalee ORTEGA MD    Date of Admission:  2017  Date of Service: 2017     CC: Stroke    History of Present Illness:  89 y.o.  female brought to the ED by EMS after nursing home staff noted severe altered mental status and unresponsive state. Nursing home staff note that the patient was unable to swallow pills at 0900 this morning which is abnormal for her. Her last knwn normal is some time last night, however, there is no exact time at this point. EMS report that the patient has been flaccid throughout her entire body with the exception of her left upper extremity, which demostrates decorticate-like spasms  There is a reported prior CVA as well as PMH of hypertension, hyperlipidemia, dementia, as well as paroxysmal Atrial fibrillation.  The stroke 2 years ago affected the right MCa distribution and left the patient with residual spastic left hemiparesis.   The patient's family expressed that she is DNR and wished no aggressive measures and to focus on comfort only.    Past Medical History:  Past Medical History:   Diagnosis Date   • Anxiety    • Arthritis    • Atrial fibrillation    • Dementia    • Depression    • Disease of thyroid gland    • Hyperlipidemia    • Hypertension    • Stroke    • TIA (transient ischemic attack)        Past Surgical History:  History reviewed. No pertinent surgical history.      Medications    (Not in a hospital admission)    Allergies:  No Known Allergies    Social Hx:  Social History     Social History   • Marital status:      Spouse name: N/A   • Number of children: N/A   • Years of education: N/A     Occupational History   • Not on file.     Social History Main Topics   • Smoking status: Never Smoker   • Smokeless tobacco: Not on file   • Alcohol use No   • Drug use: No   • Sexual activity: Defer     Other Topics Concern   • Not on file     Social History Narrative   • No narrative on  file       Family Hx:  History reviewed. No pertinent family history.    Review of Imaging:  CT of Head:  1.  Extensive old right MCA territory infarct and old left cerebellar  infarct.  2.  Early changes of massive acute left MCA infarct, with hyperdense  left MCA sign.  CT Perfusion:  1. Old right MCA territory infarct.  2. Large acute left MCA territory infarct. Little evidence to suggest  significant recoverable tissue.  These images were reviewed by myself as well as Dr. Gama and discussed with Dr. Barragan and the family.    Laboratory Result:  Lab Results   Component Value Date    WBC 7.64 07/19/2017    HGB 13.6 07/19/2017    HCT 40 07/19/2017    MCV 94.5 07/19/2017     07/19/2017     Lab Results   Component Value Date    GLUCOSE 208 (H) 05/31/2017    CALCIUM 9.5 05/31/2017     05/31/2017    K 4.4 05/31/2017    CO2 16.0 (L) 05/31/2017     05/31/2017    BUN 33 (H) 05/31/2017    CREATININE 1.30 07/19/2017    EGFRIFNONA 35 (L) 05/31/2017    BCR 23.6 05/31/2017    ANIONGAP 21.0 (H) 05/31/2017     Lab Results   Component Value Date    HGBA1C 6.1 (H) 01/27/2015     No results found for: CHOL  Lab Results   Component Value Date    TRIG 74 01/27/2015     Lab Results   Component Value Date    HDL 39 (L) 01/27/2015     No results found for: LDLCALC  No results found for: LDL  No results found for: HDLLDLRATIO  No components found for: CHOLHDL    Physical Examination:  Vitals:    07/19/17 1815   BP: 159/93   Pulse: 71   Resp:    Temp:    SpO2: 98%        General Appearance:   Well developed, well nourished, well groomed, alert, and cooperative.  Cardiovascular: Regular rate and rhythm. No carotid bruits    Neurological examination:  When deep painful stimuli applied patient has decorticate-like posturing, left greater than right.  She is nonverbal, does not follow any commands.    Interval: baseline  1a. Level Of Consciousness: 2-->Not alert: requires repeated stimulation to attend, or is obtunded  and requires strong or painful stimulation to make movements (not stereotyped)  1b. LOC Questions: 2-->Answers neither question correctly  1c. LOC Commands: 2-->Performs neither task correctly  2. Best Gaze: 2-->Forced deviation, or total gaze paresis not overcome by the oculocephalic maneuver  3. Visual: 2-->Complete hemianopia  4. Facial Palsy: 1-->Minor paralysis (flattened nasolabial fold, asymmetry on smiling)  5a. Motor Arm, Left: 4-->No movement  5b. Motor Arm, Right: 4-->No movement  6a. Motor Leg, Left: 3-->No effort against gravity: leg falls to bed immediately  6b. Motor Leg, Right: 3-->No effort against gravity: leg falls to bed immediately  7. Limb Ataxia: 0-->Absent  8. Sensory: 2-->Severe to total sensory loss: patient is not aware of being touched in the face, arm, and leg  9. Best Language: 3-->Mute, global aphasia: no usable speech or auditory comprehension  10. Dysarthria: 2-->Severe dysarthria: patients speech is so slurred as to be unintelligible in the absence of or out of proportion to any dysphasia, or is mute/anarthric  11. Extinction and Inattention (formerly Neglect): 2-->Profound safia-inattention/extinction more than 1 modality    Total (NIH Stroke Scale): 34        Diagnoses/Plan:    Ms. Recio is a 89 y.o. female who has suffered an acute left MCA ischemic stroke likely related to her paroxysmal atrial fib and not being anticoagulated.  The patient has requested that comfort measures with no aggressive therapy and treatment or testing be done.  Therefore we defer treatment at the families wishes.

## 2017-07-19 NOTE — H&P
[facility]  HISTORY AND PHYSICAL    Primary Care Physician: Silvestre Coleman MD    Subjective     Chief Complaint:  CVA    History of Present Illness:   Patient is a 89 year old  female with past medical history of anxiety, dementia, hypothyroidism, and hypertension.  She had a stroke 2 years ago with residual left sided hemiplegia.  She has been a resident of [facility] since then and has been unable to ambulate.  Patient was brought to [facility] ED today after nursing home staff noted patient to have severe altered mental status and unresponsiveness.  Her last known normal was some time last night.  EMS reported patient had been flaccid throughout her entire body with the exception of her left upper extremity, which demonstrated decorticate-like spasms.  History obtained from chart and family at bedside.  Evaluation in the ED showed large acute ischemic left MCA stroke.  ED physician spoke with patient's son and he wants no aggressive measures and requests DNR.  [place] out of [place] and is currently on way to hospital.  Two daughters present during assessment and exam and reiterate no aggressive measures.  Specifically they do not want MRI, carotid duplex, ECHO, swallow evaluation, tube feeding, or blood thinners.  They want to make sure patient is comfortable.  They are agreeable to Palliative consult but not ready for Hospice.  They want to see what happens over the next few days.  Patient will be admitted to the hospital medicine service.      Review of Systems   Unable to perform due to unresponsiveness.    Past Medical History:   Diagnosis Date   • Anxiety    • Arthritis    • Atrial fibrillation    • Dementia    • Depression    • Disease of thyroid gland    • Hyperlipidemia    • Hypertension    • Stroke    • TIA (transient ischemic attack)        History reviewed. No pertinent surgical history.    History reviewed. No pertinent family  "history.    Social History     Social History   • Marital status:      Spouse name: N/A   • Number of children: N/A   • Years of education: N/A     Occupational History   • Not on file.     Social History Main Topics   • Smoking status: Never Smoker   • Smokeless tobacco: Not on file   • Alcohol use No   • Drug use: No   • Sexual activity: Defer     Other Topics Concern   • Not on file     Social History Narrative   • No narrative on file       Medications:    (Not in a hospital admission)    Allergies:  No Known Allergies      Objective     Physical Exam:  Vital Signs: BP (!) 202/81  Pulse 69  Temp 97.5 °F (36.4 °C) (Oral)   Resp 16  Ht 65\" (165.1 cm)  Wt 140 lb (63.5 kg)  SpO2 97%  BMI 23.3 kg/m2  Physical Exam  Gen-no acute distress, severe altered mental status  HEENT-atraumatic, normocephalic, eyes closed, pupils gaze to left, moist mucous membranes present  CV-RRR, S1 S2 normal, no m/r/g  Resp-course rhonchi anterior chest, normal respiratory effort, breathing on room air  Abd-soft, NT, ND, +BS; no rebound or guarding  Ext-no edema or clubbing  Skin-warm, dry, no rashes or lesions noted  Neuro-responding to painful stimuli, nonverbal, not following commands, no spontaneous movement of extremities noted during exam  Psych-appropriate mood and behavior      Results Reviewed:    Results from last 7 days  Lab Units 07/19/17  1246 07/19/17  1244   WBC 10*3/mm3  --  7.64   HEMOGLOBIN g/dL  --  12.8   HEMOGLOBIN, POC g/dL 13.6  --    PLATELETS 10*3/mm3  --  336       Results from last 7 days  Lab Units 07/19/17  1246   CREATININE mg/dL 1.30       I have personally reviewed and interpreted available lab data, radiology studies and ECG obtained at time of admission.     Assessment / Plan     Problem List:   Hospital Problem List     Acute ischemic left MCA stroke          Assessment:  Patient is a 89 year old  female that is a resident of [facility] that presents with acute ischemic " left MCA stroke.  Family present and does not want aggressive measures.      Plan:    CVA  --neurology consulted  --family declines further stroke work up  --palliative care consult  --patient wishes to pursue comfort measures at this time without any aggressive measures        DVT prophylaxis:  TEDs/SCDs  Code Status:  DNR  Admission Status: Patient will be admitted to [facility] PATY Kari, FRANCIA 07/19/17 4:10 PM      Brief Attending Note       I have seen and examined the patient, performing an independent face-to-face diagnostic evaluation with plan of care reviewed and developed with the advanced practice clinician (APC).      Brief Summary Statement/HPI:   89 yo F with hx of prior CVA 2 years ago resulting in left sided hemiplegia as well as dementia and HTN presents from her nursing home due to AMS. CT head revealed a large left MCA stroke. Patient is obtunded at this time. Family members have all arrived. Neurology and Palliative Care have seen patient, and family wishes for comfort care.       Attending Physical Exam:  Temp:  [97.5 °F (36.4 °C)] 97.5 °F (36.4 °C)  Heart Rate:  [65-77] 76  Resp:  [16] 16  BP: (154-202)/() 167/72  Constitutional: no acute distress, obtunded  Eyes: eyes closed, but does appear to have left gaze preference  Neck: supple, no thyromegaly, trachea midline  Respiratory: Clear to auscultation bilaterally, nonlabored respirations   Cardiovascular: RRR, no murmurs, rubs, or gallops, palpable pedal pulses bilaterally  Gastrointestinal: Positive bowel sounds, soft, nontender, nondistended  Musculoskeletal: No bilateral ankle edema, no clubbing or cyanosis to bilateral lower extremities  Psychiatric: obtunded  Neurologic: RUE no movement, RLE she is moving away from my touch, left sided hemiplegia present from old stroke      Brief Assessment/Plan:  89 yo F presents with AMS and found to have a large acute left MCA stroke.     PLAN:  --Appreciate Neurology evaluation, mechanism  may be embolic due to hx of PAF not on AC due to fall risk. Due to size of the infarct, she appears to have low chance for recovery and as such a poor prognosis. Allow permissive HTN for now.  --Palliative Care following, family familiar with their services as they were involved in their father's care previously. Comfort measures only. Possible transition to hospice care in next few days if no improvement.  --Discussed at length with family and they are comfortable with the plan of care.     See above for further detailed assessment and plan developed with [facility] which I have reviewed and/or edited.    I believe this patient meets INPATIENT status due to the need for care which can only be reasonably provided in an hospital setting such as aggressive/expedited ancillary services and/or consultation services and the necessity for IV medications, close physician monitoring and/or the possible need for procedures.  In such, I feel patient’s risk for adverse outcomes and need for care warrant INPATIENT evaluation and predict the patient’s care encounter to likely last beyond 2 midnights.        Kalee Rasheed MD  07/19/17  8:42 PM

## 2017-07-19 NOTE — SIGNIFICANT NOTE
07/19/17 1444   SLP Deferred Reason   SLP Deferred Reason Unable to evaluate, medical status change  (Pt currently unresponsive and not appropriate for dysphagia evaluation. SLP will follow up tomorrow per stroke protocol as pt becomes appropriate to participate.  )

## 2017-07-19 NOTE — SIGNIFICANT NOTE
Palliative Note:  Full consult to follow tomorrow.    Pt presented to ER from [facility] this AM.  She was transferred due to mental status changes.    Discussed by phone with son, José Miguel.  He and family desire comfort care.  No IVFs, no TFs, no CPR, no intubation.    Code status updated in computer.     Comfort medications entered:  Robinul  Lorazepam  morphine

## 2017-07-19 NOTE — NURSING NOTE
Stroke Navigator CODE 19    HPI    Diana Recio is a 89 y.o.  female on whom I am evaluating as a Code 19 for a possible acute stroke.  According to EMS report, Mrs. Recio was in her normal state of health until approximately 0900 this morning.  She is a current resident of [facility].  The staff reports that they went in this morning an she was acting unusual for herself and was unable to swallow pills.  She was last seen normal last night.  EMS was called and she was brought to our facility for further evaluation.      Code 19 location: ED    · Last known well: last night    · Symptom onset: unknown; patient's symptoms were noticed at 0900.  · GCS: 6  · Baseline level of function known no  · Current symptoms include; facial droop, unresponsiveness, neglect and severe sensation loss, minimal movement in BLE, BUE flaccidity. Symptoms are currently unchanged.   · Patient is not a candidate for thrombolytic therapy due to LKW >4.5 hours  · Patient is not a candidate for Neuro Intervention due to completed CVA based upon CT perfusion scan; no salvagable pnumbra .    Stroke risk factors: atrial fibrillation, hyperlipidemia, hypertension, prior stroke and TIA's.     Prior stroke history: yes  Location: right MCA; residual deficits unknown  Antiplatelet therapy:   Anticoagulation: none documented    · Imaging performed: CT head, CTA head, CTA neck and CT perfusion    NIHSS    Interval: baseline  1a. Level Of Consciousness: 2-->Not alert: requires repeated stimulation to attend, or is obtunded and requires strong or painful stimulation to make movements (not stereotyped)  1b. LOC Questions: 2-->Answers neither question correctly  1c. LOC Commands: 2-->Performs neither task correctly  2. Best Gaze: 2-->Forced deviation, or total gaze paresis not overcome by the oculocephalic maneuver  3. Visual: 2-->Complete hemianopia  4. Facial Palsy: 1-->Minor paralysis (flattened nasolabial fold, asymmetry on smiling)  5a.  Motor Arm, Left: 4-->No movement  5b. Motor Arm, Right: 4-->No movement  6a. Motor Leg, Left: 3-->No effort against gravity: leg falls to bed immediately  6b. Motor Leg, Right: 3-->No effort against gravity: leg falls to bed immediately  7. Limb Ataxia: 0-->Absent  8. Sensory: 2-->Severe to total sensory loss: patient is not aware of being touched in the face, arm, and leg  9. Best Language: 3-->Mute, global aphasia: no usable speech or auditory comprehension  10. Dysarthria: 2-->Severe dysarthria: patients speech is so slurred as to be unintelligible in the absence of or out of proportion to any dysphasia, or is mute/anarthric  11. Extinction and Inattention (formerly Neglect): 2-->Profound safia-inattention/extinction more than 1 modality    Total (NIH Stroke Scale): 34    PLAN    Dr. Barragan in CT scan.  Patient is currently a resident of [facility] and is a DNR.  She has a large completed LEFT MCA stroke, with no reversible penumbra.  She is not a candidate for IV tPA or neuro intervention.  There is no family present at this time to discuss CT perfusion scan/prognosis.  ED nurse will attempt to contact them by phone.  Please call once they arrive if needed.    The stroke order set will need to be initiated once admission and CODE status have been determined.    We will continue to follow.    Chinyere Cade RN EXTENDER/STROKE NAVIGATOR

## 2017-07-19 NOTE — CONSULTS
Neurology    Referring Provider: Dr. Barragan    Reason for Consultation: stroke      Chief complaint: Altered mental status and dysphagia    Subjective .     History of present illness:  Ms. Recio is a 89-year-old female with a past medical history significant for hypertension, hyperlipidemia, dementia, paroxysmal A. fib, and prior ischemic stroke who presents to the ED for altered mental status and dysphagia.  She is a resident of [facility] and was noted by staff to have difficulty communicating and swallowing her pills this morning.  There was also concern for decorticate-like posturing.  She does have a history of a prior ischemic stroke about 2 years ago affecting the right MCA distribution with residual spastic left hemiparesis.  This morning she was noted to not be using her right-sided extremities very well.  On arrival to the ED, noncontrast CT head was significant for left MCA hyperdense sign in her old right MCA chronic infarct.  CT perfusion showed a large area of ischemia in the left MCA distribution.  CTA showed abrupt cut off of the proximal left MCA suspicious for occlusion.  Of note she does have a history of paroxysmal A. fib and was previously on warfarin but this was taken off because the patient was deemed a fall risk according to the patient's daughters, who are at bedside.  The patient's family expressed that she is DNR and wished no aggressive measures and to focus on comfort only.    Review of Systems:  unable to fully obtain given mental status    History  Past Medical History:   Diagnosis Date   • Anxiety    • Arthritis    • Atrial fibrillation    • Dementia    • Depression    • Disease of thyroid gland    • Hyperlipidemia    • Hypertension    • Stroke    • TIA (transient ischemic attack)    , History reviewed. No pertinent surgical history., History reviewed. No pertinent family history., Social History   Substance Use Topics   • Smoking status: Never Smoker   •  "Smokeless tobacco: None   • Alcohol use No   ,   (Not in a hospital admission) and Allergies:  Review of patient's allergies indicates no known allergies.    Objective     Vital Signs   Blood pressure 170/83, pulse 68, temperature 97.5 °F (36.4 °C), temperature source Oral, resp. rate 16, height 65\" (165.1 cm), weight 140 lb (63.5 kg), SpO2 98 %.    Physical Exam:      Gen: Lying in bed with eyes closedIn NAD. Appears stated age   Eyes: PERRL, conjuntivae/lids normal   ENT: External canals normal bilaterally. edentulous   Neck: Supple. No thyroid enlargement noted   Respiratory: CTA bilaterally. Respirations unlabored   CV: RRR, S1 and S2 nml. Radial pulses 2+ bilaterally.    Skin: No rashes noted on exposed skin. Normal tugor.   MSK: Increased tone in the left arm and leg. Restricted ROM     Neurologic:   Mental status: obtunded.    CN: Limited exam, PERRL, left gaze preference. Right lower facial droop    Motor: no active movement of her extremities   Reflexes: 2+ in the right arm and leg.  Slightly more brisk on the left.  Plantar responses extensor bilaterally     Sensory: unable to assess    Coordination: unable to assess   Gait: Unable to assess        Results Reviewed:     Labs reviewed  CT perfusion personally reviewed.  Agree with the report  CT head personally reviewed.  Agree with the report  CTA head and neck personally reviewed.  Agree with the report  EKG report reviewed                 Assessment/Plan     1. Acute left MCA ischemic stroke = mechanism likely due to cardioembolism given her history of paroxysmal A. fib and not being on anticoagulation.  The family requests comfort measures only and no aggressive treatment or testing.  We will defer MRI for now as this will not .  Consider repeating CT head tomorrow to assess for signs of cerebral edema and mass effect.  Recommend palliative consult to help discuss goals of care. I discussed the poor prognosis with her family at bedside. " Given the large area of infarct, she is at risk for edema and potentially life-threatening swelling. At best, she will likely be severely debilitated and require total care. Recommend IV fluids and consider NG tube for enteral nutrition.    2. Hypertension = recommend permissive hypertension given the large distribution of her stroke. Recommend treating if SBP > 200 or DBP >110    3. Hyperlipidemia = on atorvastatin at home.  Recommend continuing this when able to safely receive    4.  Paroxysmal A. Fib = in NSR currently on EKG. No on anticoagulation at home due to fall risk            Eliza Lambert MD  07/19/17  4:40 PM

## 2017-07-19 NOTE — ED NOTES
Contacted palliative care regarding consult. Pt is on their list per Ritika.      Lisa Galicia, RN  07/19/17 7848

## 2017-07-20 PROBLEM — I63.9 CVA (CEREBRAL VASCULAR ACCIDENT) (HCC): Status: ACTIVE | Noted: 2017-01-01

## 2017-07-20 PROBLEM — I48.91 ATRIAL FIBRILLATION (HCC): Status: ACTIVE | Noted: 2017-01-01

## 2017-07-20 PROBLEM — N18.30 CHRONIC KIDNEY DISEASE, STAGE III (MODERATE) (HCC): Status: ACTIVE | Noted: 2017-01-01

## 2017-07-20 NOTE — CONSULTS
Palliative Care Initial Consult   Attending Physician: Eliza Barajas II, DO  Referring Provider: FRANCIA Pierce    Reason for Referral:  assistance with clarification of goals of care    Code Status: Comfort Measures and Allow Natural Death (A-N-D)   Advanced Directives: Advance Directive: Patient has advance directive, copy in chart   Family/Support: Patient has son and 2 daughters, son is POA   Goals of Care: TBD.    HPI: 89-year-old female nursing home resident at time that is for the past 2 years related to CVA with residual left-sided hemiplegia and has been bedbound since that time.  Patient has additional past medical history significant for anxiety, dementia, hypothyroidism, hypertension.  Patient presented to [facility] emergency department related to altered mental status and unresponsiveness.  Workup revealed large acute ischemic left MCA stroke.  Repeat CT scan showed slight increase in the edema in the left hemisphere with mass effect and slight increased left-to-right midline shift, bilateral ischemic insults, with bilateral diffuse hemorrhage that cannot be excluded.  Palliative was consulted to assist with goals of care and advance care planning the context of complex medical decision making.  No family at bedside.      ROS: Unable to complete related to altered mental status/condition.      Past Medical History:   Diagnosis Date   • Anxiety    • Arthritis    • Atrial fibrillation    • Dementia    • Depression    • Disease of thyroid gland    • Hyperlipidemia    • Hypertension    • Stroke    • TIA (transient ischemic attack)      History reviewed. No pertinent surgical history.  Social History     Social History   • Marital status:      Spouse name: N/A   • Number of children: N/A   • Years of education: N/A     Occupational History   • Not on file.     Social History Main Topics   • Smoking status: Never Smoker   • Smokeless tobacco: Not on file   • Alcohol use No   • Drug use: No  "  • Sexual activity: Defer     Other Topics Concern   • Not on file     Social History Narrative   • No narrative on file       No Known Allergies    Current medication reviewed for route, type, dose and frequency and are current per MAR at time of dictation.    Palliative Performance Scale Score: 10%   /86 (BP Location: Right arm, Patient Position: Lying)  Pulse 93  Temp 97.9 °F (36.6 °C) (Oral)   Resp 16  Ht 65\" (165.1 cm)  Wt 113 lb 11.2 oz (51.6 kg)  SpO2 96%  BMI 18.92 kg/m2  No intake or output data in the 24 hours ending 07/20/17 0983    Physical Exam:  General Appearance:    Sleeping, does not arouse to moderate tactile/verbal stimulation, NAD   HEENT:    NC/AT, anicteric, dry MM   Neck:   supple, trachea midline, no JVD   Lungs:     Few scattered rhonchi in upper lobes bilaterally, intermittent audible respiratory congestion noted, respirations regular, even and unlabored    Heart:    RRR, normal S1 and S2, no M/R/G   Abdomen:     Normal bowel sounds, soft, non-tender, non-distended   Extremities:   Moves all extremities, no edema   Pulses:   Pulses palpable and equal bilaterally   Skin:   Warm, dry   Neurologic:   Positive response to painful stimuli nail bed pressure with brief moan, does not open eyes, does not follow commands    Psych:   Calm       Labs:     Results from last 7 days  Lab Units 07/20/17  0403   WBC 10*3/mm3 9.16   HEMOGLOBIN g/dL 11.8   HEMATOCRIT % 36.2   PLATELETS 10*3/mm3 293       Results from last 7 days  Lab Units 07/20/17  0403   SODIUM mmol/L 143   POTASSIUM mmol/L 4.9   CHLORIDE mmol/L 112*   CO2 mmol/L 24.0   BUN mg/dL 27*   CREATININE mg/dL 1.40*   GLUCOSE mg/dL 135*   CALCIUM mg/dL 9.7       Results from last 7 days  Lab Units 07/20/17  0403  07/19/17  1244   SODIUM mmol/L 143  --   --    POTASSIUM mmol/L 4.9  --   --    CHLORIDE mmol/L 112*  --   --    CO2 mmol/L 24.0  --   --    BUN mg/dL 27*  --   --    CREATININE mg/dL 1.40*  < >  --    CALCIUM mg/dL 9.7  --  "  --    ALT (SGPT) U/L  --   --  14   AST (SGOT) U/L  --   --  22   GLUCOSE mg/dL 135*  --   --    < > = values in this interval not displayed.  Imaging Results (last 72 hours)     Procedure Component Value Units Date/Time    XR Chest 1 View [[id number]] Collected:  07/19/17 1651     Updated:  07/19/17 5275    Narrative:       EXAMINATION: XR CHEST 1 VIEW-07/19/2017:      INDICATION: Acute Stroke Protocol (onset < 12 hrs).      COMPARISON: Portable chest 01/30/2015.     FINDINGS: Heart shadow is mildly enlarged and the vasculature is  cephalized. There is no evidence of overt pulmonary edema. Coarsening of  the interstitial lung markings in the left base is similar to the prior  study. No densely consolidated lung effusion or pneumothorax is seen.             Impression:       Mild pulmonary vascular congestion, with minimal if any  interstitial edema.  No clearly new chest disease is seen.     D:  07/19/2017  E:  07/19/2017     This report was finalized on 7/19/2017 10:33 PM by DR. Danye Horton MD.       CT Head Without Contrast Stroke Protocol [[id number]] Collected:  07/19/17 1630     Updated:  07/19/17 2316    Narrative:       EXAMINATION: CT HEAD WO CONTRAST, STROKE PROTOCOL-07/19/2017:      INDICATION: Acute Stroke Protocol (onset < 12 hrs).         TECHNIQUE: 5 mm unenhanced images through the brain.     The radiation dose reduction device was turned on for each scan per the  ALARA (As Low as Reasonably Achievable) protocol.     COMPARISON: 01/28/2015 head CT scan.     FINDINGS: There are expected extensive changes of encephalomalacia due  to evolution of patient's previously noted right MCA infarct. No acute  changes are seen in the right cerebral hemisphere.     There is, however, early evidence of massive left MCA infarct, with  diffuse low attenuation change visible throughout practically the entire  left MCA vascular distribution. There is a very dense left MCA sign  consistent with thrombus.     There is no  evidence of hemorrhage. Additional note is made of old left  cerebellar infarct. Ventricles are normal in size for the degree of  atrophy present. No abnormal extra-axial collection is seen.       Impression:       1.  Extensive old right MCA territory infarct and old left cerebellar  infarct.  2.  Early changes of massive acute left MCA infarct, with hyperdense  left MCA sign.     NOTE: The exam time is shown as 12:34. The study was reviewed on the CT  scan table and discussed with Dr. Barragan at 12:37.     D:  07/19/2017  E:  07/19/2017           This report was finalized on 7/19/2017 11:13 PM by DR. Dayne Horton MD.       CT Cerebral Perfusion With & Without Contrast [[id number]] Collected:  07/19/17 1305     Updated:  07/19/17 0270    Narrative:       EXAMINATION: CT CEREBRAL PERFUSION W WO CONTRAST-      INDICATION: Left sided deficits.      TECHNIQUE: Cerebral perfusion analysis was performed using computed  tomography with contrast administration including postprocessing of  parametric maps with determination of cerebral blood flow, cerebral  blood volume, and mean transit time.     The radiation dose reduction device was turned on for each scan per the  ALARA (As Low as Reasonably Achievable) protocol.     COMPARISON: Unenhanced head CT scan of same date.     FINDINGS: Decreased cerebral blood flow and cerebral blood volume are  seen corresponding to patient's old right MCA territory infarct.     Today's study shows markedly increased mean transit time and time to  drain throughout the anterior left middle cerebral artery territory.  Cerebral blood flow images show similarly, markedly decreased blood flow  essentially throughout the transitional left MCA territory distribution.  Cerebral blood volume images appear to show most of this area as infarct  with relatively little ischemic penumbra.       Impression:       1. Old right MCA territory infarct.  2. Large acute left MCA territory infarct. Little evidence  to suggest  significant recoverable tissue.     D:  07/19/2017  E:  07/19/2017     This report was finalized on 7/19/2017 11:48 PM by DR. Dayne Horton MD.       CT Angiogram Neck With & Without Contrast [[id number]] Collected:  07/19/17 1411     Updated:  07/19/17 2351    Narrative:       EXAMINATION: CT ANGIOGRAM NECK W WO CONTRAST-      INDICATION: Left sided deficits.      TECHNIQUE: 0.75 mm pre and postcontrast images through the neck with 2-D  angiographic reconstructions of the carotid arteries and vertebral  arteries.     The radiation dose reduction device was turned on for each scan per the  ALARA (As Low as Reasonably Achievable) protocol.     COMPARISON: None.     FINDINGS: Patient history indicates left-sided deficit. The  brachiocephalic vessels appear to arise normally from the heavily  calcified aortic arch. Proximal subclavian arteries appear grossly  normal. On the right, no significant common, internal or external  carotid artery stenosis is seen.     On the left, there is some calcification of the margin of the left  carotid bulb but no significant common, internal or external carotid  artery stenosis is appreciated. Vertebral arteries appear to be  codominant and normal.       Impression:       No evidence of hemodynamically significant carotid or  vertebral stenosis in the neck.     D:  07/19/2017  E:  07/19/2017     This report was finalized on 7/19/2017 11:49 PM by DR. Dayne Horton MD.       CT Angiogram Head With & Without Contrast [[id number]] Collected:  07/19/17 1401     Updated:  07/19/17 2352    Narrative:       EXAMINATION: CT ANGIOGRAM HEAD W WO CONTRAST-      INDICATION: Left sided deficits.     TECHNIQUE: Spiral acquisition 0.75 mm pre and postcontrast images  through the brain with 2-D multiplanar angiographic reconstructions of  the major intracranial arteries.     The radiation dose reduction device was turned on for each scan per the  ALARA (As Low as Reasonably Achievable) protocol.      COMPARISON: Unenhanced head CT scan and cerebral perfusion exam of same  date.     FINDINGS: Note is made of patient's old right MCA infarct. There is  increasingly dense edema throughout the entire left MCA territory.     Perfusion study shows normal appearing distal vertebral arteries,  basilar artery, anterior and right middle cerebral artery and branches.  There is proximal truncation of the left middle cerebral artery and  marked paucity of vasculature throughout the left MCA distribution.       Impression:       Truncated left middle cerebral artery, and marked paucity of  left MCA territory vascularity.     D:  07/19/2017  E:  07/19/2017     This report was finalized on 7/19/2017 11:50 PM by DR. Dayne Horton MD.       CT Head Without Contrast [[id number]] Collected:  07/20/17 0948     Updated:  07/20/17 0948    Narrative:       EXAMINATION: CT HEAD WO CONTRAST-07/20/2017:      INDICATION: AMS; I63.512-Cerebral infarction due to unspecified  occlusion or stenosis of left middle cerebral artery; R40.2431-Jenny  coma scale score 3-8, in the field (emt or ambulance); I10-Essential  (primary) hypertension.         TECHNIQUE:  CT data set of the brain was performed without intravenous  contrast.     The radiation dose reduction device was turned on for each scan per the  ALARA (As Low as Reasonably Achievable) protocol.     COMPARISON: Compared to previous CT data sets of the brain yesterday,  07/19/2017.     FINDINGS:   1. There is evidence of an old infarct in the right cerebral hemisphere  with encephalomalacia and acquired porencephaly. In addition, there is  abnormal low attenuation adjacent to the encephalomalacia of  indeterminate age. These findings are stable from yesterday.  2. There is evolving infarct in the left cerebral hemisphere with low  attenuation.  3. There is increased attenuation in the white matter on the left  involving left periventricular region and around the evolving infarct in  the  right hemisphere. This is more likely contrast stain rather than  bilateral diffuse hemorrhage.  4. There is circulating contrast in the basal vessels of the brain.  5. There is mass effect on the left with edema and midline shift left to  right.       Impression:       1.  Slight increase in the edema in the left hemisphere with mass effect  and slight increased left to right midline shift.  2.  Bilateral ischemic insults.  3.  Evidence of increased attenuation in the left periventricular white  matter and around the rightward convexity and hemispheric gray matter  for which contrast stain is favored over bilateral hemorrhage, although  in the absence of an interventional selective contrast injection  radiographically, bilateral diffuse hemorrhage cannot be excluded in  this case. These are definitely new densities in the brain bilaterally  and should be carefully followed.     D:  07/20/2017  E:  07/20/2017                   Diagnostics: Reviewed    A:   Patient Active Problem List   Diagnosis   • Acute ischemic left MCA stroke   • Dementia         S/S:   1. Resp congestion- 2 prn robinul past 24h-Scheduled Robinul  2. AMS-provider when necessary Ativan for potential seizures     P:   Called patient son José Miguel Recio at [phone] to discussed patient's current condition, prognosis, treatment options, goals of care, and CODE STATUS.  Confirms DNR/DNI CODE STATUS and comfort focused plan of care.  Discussed patient will qualify for inpatient hospice given her current condition and he confirmed that comfort his primary goal of care and is agreeable to inpatient hospice.  Will consult inpatient hospice for admit, patient with symptoms of respiratory congestion and secretions at this time, anticipate continued decline given progression of patient's CVA. Thank you for this consult and allowing us to participate in patient's plan of care. Palliative Care Team will continue to follow patient.     Time spent:40 minutes spent  reviewing medical and medication records, assessing and examining patient, discussing with family, answering questions, providing some guidance about a plan and documentation of care, and coordinating care with other healthcare members, with > 10min time spent face to face.     Pallavi Nance, APRN  7/20/2017      EMR Dragon/Transcription disclaimer:  Much of this encounter note is an electronic transcription/translation of spoken language to printed text. Electronic translation of spoken language may permit erroneous, or at times, nonsensical words or phrases to be inadvertently transcribed. Although I have reviewed the note for such errors, some may still exist.

## 2017-07-20 NOTE — PROGRESS NOTES
Continued Stay Note  [facility]     Patient Name: Diana Recio  MRN: [id number]  Today's Date: 7/20/2017    Admit Date: 7/19/2017          Discharge Plan       07/20/17 1432    Case Management/Social Work Plan    Plan [facility] inpatient hospice    Additional Comments Hospice consult received.  Chart reviewed.  Met with pt and family.  Pt unresponsive to RN touch and voice during assessment.  Discussed inpt hospice with son and 2 dtrs.  All agreeable with comfort based plan of care.  Spoke with  who is agreeable with hospice taking over care and is discharging pt from hospital services.  Pt will be admitted to hospice services and receive general inpatient hospice for skilled nursing assessment and interventions for symptom mgmt of dyspnea and secretions with scheduled and prn IV med administration and monitoring for effectiveness of these meds.  Called and left message with Brian Hernadez RN,  regarding pt's plan.  If hospice team can be of further assist, call ext [phone].               Discharge Codes     None            Mame Nunez RN

## 2017-07-20 NOTE — SIGNIFICANT NOTE
07/20/17 0913   SLP Deferred Reason   SLP Deferred Reason Patient unavailable for evaluation  (Consult received for swallow eval after failed RN CVA dysphagia screen. Spoke to RN who reported pt not alert/appropriate for eval @ this time. Further, she indicated that family may not desire eval, as pt is on comfort measures. No family present @ this time. Will place eval on hold. Please contact SLP for eval as appropriate. Thank you.)

## 2017-07-20 NOTE — H&P
Hospice History and Physical     Patient Name:  Diana Recio   : 1925   Sex: female    Patient Care Team:  Silvestre Coleman MD as PCP - General (Hospitalist)    Code Status: Comfort measures    Subjective     Patient is a 89 y.o. female admitted to hospital on 17 with Left MCA CVA. Pt is a resident of [facility] and was brought to the ED after pt found unresponsive; last known normal was previous night. NIH score 34.    PMHx CVA (two years ago), anxiety, dementia, hypothyroidism, and hypertension.    Due to edematous progression of CVA causing midline shift, pt's condition continued to deteriorate. Family desired comfort care and pt was transitioned to inpt Hospice services on 17.     Review of Systems  Review of Systems   Unable to perform ROS: Patient unresponsive       History  Past Medical History:   Diagnosis Date   • Anxiety    • Arthritis    • Atrial fibrillation    • Dementia    • Depression    • Disease of thyroid gland    • Hyperlipidemia    • Hypertension    • Stroke    • TIA (transient ischemic attack)      No past surgical history on file.  Current Facility-Administered Medications   Medication Dose Route Frequency Provider Last Rate Last Dose   • acetaminophen (TYLENOL) suppository 650 mg  650 mg Rectal Q4H PRN Eliza M Jenn II, DO       • artificial tears (LUBRIFRESH P.M.) ophthalmic ointment   Topical TID Eliza M Jenn II, DO       • bisacodyl (DULCOLAX) suppository 10 mg  10 mg Rectal Daily PRN FRANCIA Haro       • glycopyrrolate (ROBINUL) injection 0.4 mg  0.4 mg Intravenous Q8H Eliza M Jenn II, DO   0.4 mg at 17 1450   • glycopyrrolate (ROBINUL) injection 0.4 mg  0.4 mg Intravenous Q6H PRN Eliza M Jenn II, DO       • LORazepam (ATIVAN) injection 0.5 mg  0.5 mg Intravenous Q4H PRN Eliza M Jenn II, DO       • LORazepam (ATIVAN) injection 2 mg  2 mg Intravenous Q15 Min PRN Eliza M Jenn II, DO       • Morphine sulfate (PF)  injection 2 mg  2 mg Intravenous Q2H PRN Eliza M Jenn II, DO       • palliative care oral rinse   Mouth/Throat 4x Daily Eliza M Jenn II, DO       • sodium chloride 0.9 % flush 1-10 mL  1-10 mL Intravenous PRN Eliza M Jenn II, DO       • sodium chloride 0.9 % flush 10 mL  10 mL Intravenous PRN Eliza M Jenn II, DO            •  acetaminophen  •  bisacodyl  •  glycopyrrolate  •  LORazepam  •  LORazepam  •  Morphine  •  sodium chloride  •  sodium chloride  No Known Allergies  No family history on file.  Social History     Social History   • Marital status:      Spouse name: N/A   • Number of children: N/A   • Years of education: N/A     Occupational History   • Not on file.     Social History Main Topics   • Smoking status: Never Smoker   • Smokeless tobacco: Not on file   • Alcohol use No   • Drug use: No   • Sexual activity: Defer     Other Topics Concern   • Not on file     Social History Narrative   • No narrative on file       Objective     Vital Signs  Temp:  [97.9 °F (36.6 °C)-98.4 °F (36.9 °C)] 97.9 °F (36.6 °C)  Heart Rate:  [66-93] 93  Resp:  [16] 16  BP: (147-202)/() 147/86    PPS: Palliative Performance Scale score as of 7/20/2017, 3:40 PM is 10% based on the following measures:   Ambulation: Totally bed bound  Activity and Evidence of Disease: Unable to do any work, extensive evidence of disease  Self-Care: Total care  Intake:  Mouth care only  LOC: Drowsy or coma    Physical Exam:  Physical Exam   Constitutional: No distress.   HENT:   Head: Normocephalic.   Appears to be small amount of dried blood in mouth. Teeth stable.    Eyes:   Eyes remained closed   Neck: No JVD present.   Cardiovascular: Normal rate and regular rhythm.  Exam reveals no friction rub.    No murmur heard.  Pulmonary/Chest: Effort normal.   Shallow, clear   Abdominal: Soft. She exhibits no distension. There is no tenderness.   Musculoskeletal: She exhibits deformity (Left hand). She exhibits no edema.    Neurological:   unresponsive   Skin: Skin is warm and dry. She is not diaphoretic. There is pallor.   Psychiatric:   Unable to assess       Results Review:   Lab Results   Component Value Date    HGBA1C 5.90 (H) 07/20/2017       Lab Results   Component Value Date    GLUCOSE 135 (H) 07/20/2017    BUN 27 (H) 07/20/2017    CREATININE 1.40 (H) 07/20/2017    EGFRIFNONA 35 (L) 07/20/2017    BCR 19.3 07/20/2017    K 4.9 07/20/2017    CO2 24.0 07/20/2017    CALCIUM 9.7 07/20/2017    ALBUMIN 3.60 05/31/2017    LABIL2 1.2 (L) 05/31/2017    AST 22 07/19/2017    ALT 14 07/19/2017       WBC   Date Value Ref Range Status   07/20/2017 9.16 3.50 - 10.80 10*3/mm3 Final     RBC   Date Value Ref Range Status   07/20/2017 3.75 (L) 3.89 - 5.14 10*6/mm3 Final     Hemoglobin   Date Value Ref Range Status   07/20/2017 11.8 11.5 - 15.5 g/dL Final     Hematocrit   Date Value Ref Range Status   07/20/2017 36.2 34.5 - 44.0 % Final     MCV   Date Value Ref Range Status   07/20/2017 96.5 80.0 - 99.0 fL Final     MCH   Date Value Ref Range Status   07/20/2017 31.5 (H) 27.0 - 31.0 pg Final     MCHC   Date Value Ref Range Status   07/20/2017 32.6 32.0 - 36.0 g/dL Final     RDW   Date Value Ref Range Status   07/20/2017 14.2 11.3 - 14.5 % Final     RDW-SD   Date Value Ref Range Status   07/20/2017 49.0 37.0 - 54.0 fl Final     MPV   Date Value Ref Range Status   07/20/2017 11.2 6.0 - 12.0 fL Final     Platelets   Date Value Ref Range Status   07/20/2017 293 150 - 450 10*3/mm3 Final     Neutrophil %   Date Value Ref Range Status   07/19/2017 67.9 41.0 - 71.0 % Final     Lymphocyte %   Date Value Ref Range Status   07/19/2017 21.9 (L) 24.0 - 44.0 % Final     Monocyte %   Date Value Ref Range Status   07/19/2017 5.6 0.0 - 12.0 % Final     Eosinophil %   Date Value Ref Range Status   07/19/2017 3.8 (H) 0.0 - 3.0 % Final     Basophil %   Date Value Ref Range Status   07/19/2017 0.7 0.0 - 1.0 % Final     Immature Grans %   Date Value Ref Range Status  "  07/19/2017 0.1 0.0 - 0.6 % Final     Neutrophils, Absolute   Date Value Ref Range Status   07/19/2017 5.19 1.50 - 8.30 10*3/mm3 Final     Lymphocytes, Absolute   Date Value Ref Range Status   07/19/2017 1.67 0.60 - 4.80 10*3/mm3 Final     Monocytes, Absolute   Date Value Ref Range Status   07/19/2017 0.43 0.00 - 1.00 10*3/mm3 Final     Eosinophils, Absolute   Date Value Ref Range Status   07/19/2017 0.29 0.00 - 0.30 10*3/mm3 Final     Basophils, Absolute   Date Value Ref Range Status   07/19/2017 0.05 0.00 - 0.20 10*3/mm3 Final     Immature Grans, Absolute   Date Value Ref Range Status   07/19/2017 0.01 0.00 - 0.03 10*3/mm3 Final     Principal Problem:    Acute ischemic left MCA stroke  Active Problems:    Dementia    Chronic kidney disease, stage III (moderate)    Atrial fibrillation    CVA (cerebral vascular accident)      Assessment/Plan   Assessment/Plan:     Family (pt's children) at bedside; sweet, supportive of each other. \"Just keep her comfortable.\" Discussion on s/s of dying process. Their father passed over a year ago \"so we are familiar with the process.\" will continue to monitor for increased need.     Continue comfort measures orders  PRNs available    Total Visit Time: 30 MINUTES  Face to Face Time: 15 MIN    Justification for care:  Patient meets criteria for acute in-patient care with required nursing assessment and interventions for symptoms with IV medications.      Nadine Aguayo, APRN  (C) [phone]  (O) [phone]  07/20/17  3:30 PM         Agree with documented A&P as stated above. Pt with poor prognosis and is appropriate for [facility] hospice for management of sxs through end of life, including upper airway secretions.      Viktoriya Palma MD    7/20/2017    "

## 2017-07-20 NOTE — DISCHARGE PLACEMENT REQUEST
"Diana Conti (89 y.o. Female)     Date of Birth Social Security Number Address Home Phone MRN    09/09/1925  [address] [id number] [phone] [id number]    Orthodoxy Marital Status          Protestant        Admission Date Admission Type Admitting Provider Attending Provider Department, Room/Bed    7/19/17 Emergency Eliza Barajas II, Eliza Dna II,  [facility] 4H, S473/1    Discharge Date Discharge Disposition Discharge Destination                      Attending Provider: Eliza Barajas II, DO     Allergies:  No Known Allergies    Isolation:  None   Infection:  None   Code Status:  Comfort Juan Jose    Ht:  65\" (165.1 cm)   Wt:  113 lb 11.2 oz (51.6 kg)    Admission Cmt:  None   Principal Problem:  Acute ischemic left MCA stroke [I63.512]                 Active Insurance as of 7/19/2017     Primary Coverage     Payor Plan Insurance Group Employer/Plan Group    MEDICARE MEDICARE A & B      Payor Plan Address Payor Plan Phone Number Effective From Effective To    [address] [phone] 9/1/1990     [place] [address]       Subscriber Name Subscriber Birth Date Member ID       DIANA CONTI 9/9/1925 [id number]           Secondary Coverage     Payor Plan Insurance Group Employer/Plan Group    [place]      Payor Plan Address Payor Plan Phone Number Effective From Effective To    [address] [phone] 7/19/2017     [place] [address]       Subscriber Name Subscriber Birth Date Member ID       DIANA CONTI 9/9/1925 [id number]                 Emergency Contacts      (Rel.) Home Phone Work Phone Mobile Phone    José Miguel Conti (Power of ) [phone] -- [phone]    HemalCydney (Daughter) -- -- [phone]    Loida Conti (Daughter) -- -- [phone]            Emergency Contact Information     Name Relation Home Work Mobile    José Miguel Conti Power of  [phone]    HemalCydney Daughter   " [phone]    Loida Recio Daughter   [phone]          Insurance Information                MEDICARE/MEDICARE A & B Phone: [phone]    Subscriber: Diana Recio Subscriber#: [id number]    Group#:  Precert#:         [place] Phone: [phone]    Subscriber: Diana Recio Subscriber#: [id number]    Group#:  Precert#:              History & Physical      Kalee ORTEGA MD at 7/19/2017  4:10 PM              [facility] Medicine Services  HISTORY AND PHYSICAL    Primary Care Physician: Silvestre Coleman MD    Subjective     Chief Complaint:  CVA    History of Present Illness:   Patient is a 89 year old  female with past medical history of anxiety, dementia, hypothyroidism, and hypertension.  She had a stroke 2 years ago with residual left sided hemiplegia.  She has been a resident of [facility] since then and has been unable to ambulate.  Patient was brought to [facility] ED today after nursing home staff noted patient to have severe altered mental status and unresponsiveness.  Her last known normal was some time last night.  EMS reported patient had been flaccid throughout her entire body with the exception of her left upper extremity, which demonstrated decorticate-like spasms.  History obtained from chart and family at bedside.  Evaluation in the ED showed large acute ischemic left MCA stroke.  ED physician spoke with patient's son and he wants no aggressive measures and requests DNR.  [place] out of [place] and is currently on way to hospital.  Two daughters present during assessment and exam and reiterate no aggressive measures.  Specifically they do not want MRI, carotid duplex, ECHO, swallow evaluation, tube feeding, or blood thinners.  They want to make sure patient is comfortable.  They are agreeable to Palliative consult but not ready for Hospice.  They want to see what happens over the next few days.  Patient will be admitted to  "the hospital medicine service.      Review of Systems   Unable to perform due to unresponsiveness.    Past Medical History:   Diagnosis Date   • Anxiety    • Arthritis    • Atrial fibrillation    • Dementia    • Depression    • Disease of thyroid gland    • Hyperlipidemia    • Hypertension    • Stroke    • TIA (transient ischemic attack)        History reviewed. No pertinent surgical history.    History reviewed. No pertinent family history.    Social History     Social History   • Marital status:      Spouse name: N/A   • Number of children: N/A   • Years of education: N/A     Occupational History   • Not on file.     Social History Main Topics   • Smoking status: Never Smoker   • Smokeless tobacco: Not on file   • Alcohol use No   • Drug use: No   • Sexual activity: Defer     Other Topics Concern   • Not on file     Social History Narrative   • No narrative on file       Medications:    (Not in a hospital admission)    Allergies:  No Known Allergies      Objective     Physical Exam:  Vital Signs: BP (!) 202/81  Pulse 69  Temp 97.5 °F (36.4 °C) (Oral)   Resp 16  Ht 65\" (165.1 cm)  Wt 140 lb (63.5 kg)  SpO2 97%  BMI 23.3 kg/m2  Physical Exam  Gen-no acute distress, severe altered mental status  HEENT-atraumatic, normocephalic, eyes closed, pupils gaze to left, moist mucous membranes present  CV-RRR, S1 S2 normal, no m/r/g  Resp-course rhonchi anterior chest, normal respiratory effort, breathing on room air  Abd-soft, NT, ND, +BS; no rebound or guarding  Ext-no edema or clubbing  Skin-warm, dry, no rashes or lesions noted  Neuro-responding to painful stimuli, nonverbal, not following commands, no spontaneous movement of extremities noted during exam  Psych-appropriate mood and behavior      Results Reviewed:    Results from last 7 days  Lab Units 07/19/17  1246 07/19/17  1244   WBC 10*3/mm3  --  7.64   HEMOGLOBIN g/dL  --  12.8   HEMOGLOBIN, POC g/dL 13.6  --    PLATELETS 10*3/mm3  --  336       Results " from last 7 days  Lab Units 07/19/17  1246   CREATININE mg/dL 1.30       I have personally reviewed and interpreted available lab data, radiology studies and ECG obtained at time of admission.     Assessment / Plan     Problem List:   Hospital Problem List     Acute ischemic left MCA stroke          Assessment:  Patient is a 89 year old  female that is a resident of [facility] that presents with acute ischemic left MCA stroke.  Family present and does not want aggressive measures.      Plan:    CVA  --neurology consulted  --family declines further stroke work up  --palliative care consult  --patient wishes to pursue comfort measures at this time without any aggressive measures        DVT prophylaxis:  TEDs/SCDs  Code Status:  DNR  Admission Status: Patient will be admitted to FRANCIA Zavala 07/19/17 4:10 PM      Brief Attending Note       I have seen and examined the patient, performing an independent face-to-face diagnostic evaluation with plan of care reviewed and developed with the advanced practice clinician (APC).      Brief Summary Statement/HPI:   89 yo F with hx of prior CVA 2 years ago resulting in left sided hemiplegia as well as dementia and HTN presents from her nursing home due to AMS. CT head revealed a large left MCA stroke. Patient is obtunded at this time. Family members have all arrived. Neurology and Palliative Care have seen patient, and family wishes for comfort care.       Attending Physical Exam:  Temp:  [97.5 °F (36.4 °C)] 97.5 °F (36.4 °C)  Heart Rate:  [65-77] 76  Resp:  [16] 16  BP: (154-202)/() 167/72  Constitutional: no acute distress, obtunded  Eyes: eyes closed, but does appear to have left gaze preference  Neck: supple, no thyromegaly, trachea midline  Respiratory: Clear to auscultation bilaterally, nonlabored respirations   Cardiovascular: RRR, no murmurs, rubs, or gallops, palpable pedal pulses bilaterally  Gastrointestinal: Positive  bowel sounds, soft, nontender, nondistended  Musculoskeletal: No bilateral ankle edema, no clubbing or cyanosis to bilateral lower extremities  Psychiatric: obtunded  Neurologic: RUE no movement, RLE she is moving away from my touch, left sided hemiplegia present from old stroke      Brief Assessment/Plan:  89 yo F presents with AMS and found to have a large acute left MCA stroke.     PLAN:  --Appreciate Neurology evaluation, mechanism may be embolic due to hx of PAF not on AC due to fall risk. Due to size of the infarct, she appears to have low chance for recovery and as such a poor prognosis. Allow permissive HTN for now.  --Palliative Care following, family familiar with their services as they were involved in their father's care previously. Comfort measures only. Possible transition to hospice care in next few days if no improvement.  --Discussed at length with family and they are comfortable with the plan of care.     See above for further detailed assessment and plan developed with APC which I have reviewed and/or edited.    I believe this patient meets INPATIENT status due to the need for care which can only be reasonably provided in an hospital setting such as aggressive/expedited ancillary services and/or consultation services and the necessity for IV medications, close physician monitoring and/or the possible need for procedures.  In such, I feel patient’s risk for adverse outcomes and need for care warrant INPATIENT evaluation and predict the patient’s care encounter to likely last beyond 2 midnights.        Kalee Rasheed MD  07/19/17  8:42 PM            Electronically signed by Kalee ORTEGA MD at 7/19/2017  8:52 PM

## 2017-07-20 NOTE — DISCHARGE SUMMARY
HOSPITAL MEDICINE DISCHARGE SUMMARY    Date of Admission: 7/19/2017  Date of Discharge:  7/20/2017    Discharge Diagnoses:  Principal Problem:    Acute ischemic left MCA stroke  Active Problems:    Dementia    Chronic kidney disease, stage III (moderate)    Atrial fibrillation      Presenting Problem/History of Present Illness  Acute ischemic left MCA stroke [I63.512]  Patient is a 89 y.o. female presented with large left MCA stroke.    Hospital Course    Large left MCA infarct: 89 year old female who presented from her nursing facility after she was found difficult to arouse. CT perfusion head on arrival showed a large left sided defect in MCA region. The family were all in agreement to proceed without aggressive care. Shortly after arrival the patient became even more difficult to arouse. A repeat CT head was performed which showed expansion of infarct with shift and edema. Hospice was consulted who felt the patient was appropriate for inpatient hospice.    Procedures Performed     None    Consults:   Consults     Date and Time Order Name Status Description    7/19/2017 2249 Inpatient Consult to Palliative Care MD      7/19/2017 1640 Inpatient Consult to Neurology Completed     7/19/2017 1626 Inpatient Consult to Palliative Care MD      7/19/2017 1232 Consult Interventional Neurologist and/or Stroke Team Completed           Pertinent Test Results:   Lab Results (last 7 days)     Procedure Component Value Units Date/Time    POC Protime / INR [[id number]]  (Abnormal) Collected:  07/19/17 1242    Specimen:  Blood Updated:  07/19/17 1250     Protime 11.7 (L) seconds      INR 1.0      Serial Number: [id number]:  [id number]       CBC & Differential [[id number]] Collected:  07/19/17 1244    Specimen:  Blood Updated:  07/19/17 1257    Narrative:       The following orders were created for panel order CBC & Differential.  Procedure                               Abnormality         Status                     ---------                                -----------         ------                     CBC Auto Differential[[id number]]        Abnormal            Final result                 Please view results for these tests on the individual orders.    CBC Auto Differential [[id number]]  (Abnormal) Collected:  07/19/17 1244    Specimen:  Blood Updated:  07/19/17 1257     WBC 7.64 10*3/mm3      RBC 4.19 10*6/mm3      Hemoglobin 12.8 g/dL      Hematocrit 39.6 %      MCV 94.5 fL      MCH 30.5 pg      MCHC 32.3 g/dL      RDW 14.0 %      RDW-SD 48.3 fl      MPV 11.1 fL      Platelets 336 10*3/mm3      Neutrophil % 67.9 %      Lymphocyte % 21.9 (L) %      Monocyte % 5.6 %      Eosinophil % 3.8 (H) %      Basophil % 0.7 %      Immature Grans % 0.1 %      Neutrophils, Absolute 5.19 10*3/mm3      Lymphocytes, Absolute 1.67 10*3/mm3      Monocytes, Absolute 0.43 10*3/mm3      Eosinophils, Absolute 0.29 10*3/mm3      Basophils, Absolute 0.05 10*3/mm3      Immature Grans, Absolute 0.01 10*3/mm3     POC CHEM 8 [[id number]]  (Abnormal) Collected:  07/19/17 1246    Specimen:  Blood Updated:  07/19/17 1305     Glucose 162 (H) mg/dL      BUN, Arterial 26 mg/dL      Creatinine 1.30 mg/dL      Sodium 143 mmol/L      Potassium 5.3 (H) mmol/L      Chloride 107 mmol/L      Total CO2 24 mmol/L      Hemoglobin 13.6 g/dL       Serial Number: [id number]  [id number]        Hematocrit 40 %      Ionized Calcium 1.32 mmol/L     POC Troponin, Rapid [[id number]]  (Normal) Collected:  07/19/17 1250    Specimen:  Blood Updated:  07/19/17 1310     Troponin I 0.02 ng/mL       Serial Number: [id number]  [id number]       AST [[id number]]  (Normal) Collected:  07/19/17 1244    Specimen:  Blood Updated:  07/19/17 1313     AST (SGOT) 22 U/L     ALT [[id number]]  (Normal) Collected:  07/19/17 1244    Specimen:  Blood Updated:  07/19/17 1313     ALT (SGPT) 14 U/L     aPTT [[id number]]  (Abnormal) Collected:  07/19/17 1244    Specimen:  Blood Updated:  07/19/17 1324     PTT <24.0 (L)  seconds     Narrative:       PTT = The equivalent PTT values for the therapeutic range of heparin levels at 0.3 to 0.5 U/ml are 45 to 60 seconds.    Light Blue Top [[id number]] Collected:  07/19/17 1244    Specimen:  Blood Updated:  07/19/17 1401     Extra Tube hold for add-on      Auto resulted       Lavender Top [[id number]] Collected:  07/19/17 1244    Specimen:  Blood Updated:  07/19/17 1401     Extra Tube hold for add-on      Auto resulted       Green Top (Gel) [[id number]] Collected:  07/19/17 1244    Specimen:  Blood Updated:  07/19/17 1401     Extra Tube Hold for add-ons.      Auto resulted.       Gold Top - SST [[id number]] Collected:  07/19/17 1244    Specimen:  Blood Updated:  07/19/17 1401     Extra Tube Hold for add-ons.      Auto resulted.       [place] Draw [[id number]] Collected:  07/19/17 1244    Specimen:  Blood Updated:  07/19/17 2253    Narrative:       The following orders were created for panel order [place] Draw.  Procedure                               Abnormality         Status                     ---------                               -----------         ------                     Light Blue Top[[id number]]                                   Final result               Green Top (Gel)[[id number]]                                  Final result               Lavender Top[[id number]]                                     Final result               Gold Top - SST[[id number]]                                   Final result               Green Top (No Gel)[[id number]]                                                            Please view results for these tests on the individual orders.    CBC (No Diff) [[id number]]  (Abnormal) Collected:  07/20/17 0403    Specimen:  Blood Updated:  07/20/17 0443     WBC 9.16 10*3/mm3      RBC 3.75 (L) 10*6/mm3      Hemoglobin 11.8 g/dL      Hematocrit 36.2 %      MCV 96.5 fL      MCH 31.5 (H) pg      MCHC 32.6 g/dL      RDW 14.2 %      RDW-SD 49.0 fl      MPV 11.2 fL       Platelets 293 10*3/mm3     Lipid Panel [[id number]]  (Abnormal) Collected:  07/20/17 0403    Specimen:  Blood Updated:  07/20/17 0517     Total Cholesterol 129 mg/dL      Triglycerides 124 mg/dL      HDL Cholesterol 31 (L) mg/dL      LDL Cholesterol  75 mg/dL     Narrative:       Cholesterol Reference Ranges:   Desirable       < 200 mg/dL   Borderline    200-239 mg/dL   High Risk       > 239 mg/dL    Triglyceride Reference Ranges:   Normal          < 150 mg/dL   Borderline    150-199 mg/dL   High          200-499 mg/dL   Very High       > 499 mg/dL    HDL Reference Ranges:   Low              < 40 mg/dL   High             > 59 mg/dL    LDL Reference Ranges:   Optimal         < 100 mg/dL   Near Optimal  100-129 mg/dL   Borderline    130-159 mg/dL   High          160-189 mg/dL   Very High       > 189 mg/dL    Basic Metabolic Panel [[id number]]  (Abnormal) Collected:  07/20/17 0403    Specimen:  Blood Updated:  07/20/17 0517     Glucose 135 (H) mg/dL      BUN 27 (H) mg/dL      Creatinine 1.40 (H) mg/dL      Sodium 143 mmol/L      Potassium 4.9 mmol/L      Chloride 112 (H) mmol/L      CO2 24.0 mmol/L      Calcium 9.7 mg/dL      eGFR Non African Amer 35 (L) mL/min/1.73      BUN/Creatinine Ratio 19.3     Anion Gap 7.0 mmol/L     Narrative:       National Kid[employer] Guidelines    Stage     Description        GFR  1         Normal or High     90+  2         Mild decrease      60-89  3         Moderate decrease  30-59  4         Severe decrease    15-29  5         Kidney failure     <15    Hemoglobin A1c [[id number]]  (Abnormal) Collected:  07/20/17 0403    Specimen:  Blood Updated:  07/20/17 0521     Hemoglobin A1C 5.90 (H) %     Narrative:       The American Diabetes Association recommends maintenance of Hemoglobin A1C at 7.0% or lower. Goals for Hemoglobin A1C reduction may need to be modified if hypoglycemia is a problem.        Imaging Results (all)     Procedure Component Value Units Date/Time    XR Chest 1 View  [[id number]] Collected:  07/19/17 1651     Updated:  07/19/17 2235    Narrative:       EXAMINATION: XR CHEST 1 VIEW-07/19/2017:      INDICATION: Acute Stroke Protocol (onset < 12 hrs).      COMPARISON: Portable chest 01/30/2015.     FINDINGS: Heart shadow is mildly enlarged and the vasculature is  cephalized. There is no evidence of overt pulmonary edema. Coarsening of  the interstitial lung markings in the left base is similar to the prior  study. No densely consolidated lung effusion or pneumothorax is seen.             Impression:       Mild pulmonary vascular congestion, with minimal if any  interstitial edema.  No clearly new chest disease is seen.     D:  07/19/2017  E:  07/19/2017     This report was finalized on 7/19/2017 10:33 PM by DR. Dayne Horton MD.       CT Head Without Contrast Stroke Protocol [[id number]] Collected:  07/19/17 1630     Updated:  07/19/17 2316    Narrative:       EXAMINATION: CT HEAD WO CONTRAST, STROKE PROTOCOL-07/19/2017:      INDICATION: Acute Stroke Protocol (onset < 12 hrs).         TECHNIQUE: 5 mm unenhanced images through the brain.     The radiation dose reduction device was turned on for each scan per the  ALARA (As Low as Reasonably Achievable) protocol.     COMPARISON: 01/28/2015 head CT scan.     FINDINGS: There are expected extensive changes of encephalomalacia due  to evolution of patient's previously noted right MCA infarct. No acute  changes are seen in the right cerebral hemisphere.     There is, however, early evidence of massive left MCA infarct, with  diffuse low attenuation change visible throughout practically the entire  left MCA vascular distribution. There is a very dense left MCA sign  consistent with thrombus.     There is no evidence of hemorrhage. Additional note is made of old left  cerebellar infarct. Ventricles are normal in size for the degree of  atrophy present. No abnormal extra-axial collection is seen.       Impression:       1.  Extensive old right  MCA territory infarct and old left cerebellar  infarct.  2.  Early changes of massive acute left MCA infarct, with hyperdense  left MCA sign.     NOTE: The exam time is shown as 12:34. The study was reviewed on the CT  scan table and discussed with Dr. Barragan at 12:37.     D:  07/19/2017  E:  07/19/2017           This report was finalized on 7/19/2017 11:13 PM by DR. Dayne Horton MD.       CT Cerebral Perfusion With & Without Contrast [[id number]] Collected:  07/19/17 1305     Updated:  07/19/17 2350    Narrative:       EXAMINATION: CT CEREBRAL PERFUSION W WO CONTRAST-      INDICATION: Left sided deficits.      TECHNIQUE: Cerebral perfusion analysis was performed using computed  tomography with contrast administration including postprocessing of  parametric maps with determination of cerebral blood flow, cerebral  blood volume, and mean transit time.     The radiation dose reduction device was turned on for each scan per the  ALARA (As Low as Reasonably Achievable) protocol.     COMPARISON: Unenhanced head CT scan of same date.     FINDINGS: Decreased cerebral blood flow and cerebral blood volume are  seen corresponding to patient's old right MCA territory infarct.     Today's study shows markedly increased mean transit time and time to  drain throughout the anterior left middle cerebral artery territory.  Cerebral blood flow images show similarly, markedly decreased blood flow  essentially throughout the transitional left MCA territory distribution.  Cerebral blood volume images appear to show most of this area as infarct  with relatively little ischemic penumbra.       Impression:       1. Old right MCA territory infarct.  2. Large acute left MCA territory infarct. Little evidence to suggest  significant recoverable tissue.     D:  07/19/2017  E:  07/19/2017     This report was finalized on 7/19/2017 11:48 PM by DR. Dayne Horton MD.       CT Angiogram Neck With & Without Contrast [[id number]] Collected:  07/19/17 1411       Updated:  07/19/17 2351    Narrative:       EXAMINATION: CT ANGIOGRAM NECK W WO CONTRAST-      INDICATION: Left sided deficits.      TECHNIQUE: 0.75 mm pre and postcontrast images through the neck with 2-D  angiographic reconstructions of the carotid arteries and vertebral  arteries.     The radiation dose reduction device was turned on for each scan per the  ALARA (As Low as Reasonably Achievable) protocol.     COMPARISON: None.     FINDINGS: Patient history indicates left-sided deficit. The  brachiocephalic vessels appear to arise normally from the heavily  calcified aortic arch. Proximal subclavian arteries appear grossly  normal. On the right, no significant common, internal or external  carotid artery stenosis is seen.     On the left, there is some calcification of the margin of the left  carotid bulb but no significant common, internal or external carotid  artery stenosis is appreciated. Vertebral arteries appear to be  codominant and normal.       Impression:       No evidence of hemodynamically significant carotid or  vertebral stenosis in the neck.     D:  07/19/2017  E:  07/19/2017     This report was finalized on 7/19/2017 11:49 PM by DR. Dayne Horton MD.       CT Angiogram Head With & Without Contrast [[id number]] Collected:  07/19/17 1401     Updated:  07/19/17 2352    Narrative:       EXAMINATION: CT ANGIOGRAM HEAD W WO CONTRAST-      INDICATION: Left sided deficits.     TECHNIQUE: Spiral acquisition 0.75 mm pre and postcontrast images  through the brain with 2-D multiplanar angiographic reconstructions of  the major intracranial arteries.     The radiation dose reduction device was turned on for each scan per the  ALARA (As Low as Reasonably Achievable) protocol.     COMPARISON: Unenhanced head CT scan and cerebral perfusion exam of same  date.     FINDINGS: Note is made of patient's old right MCA infarct. There is  increasingly dense edema throughout the entire left MCA territory.     Perfusion  study shows normal appearing distal vertebral arteries,  basilar artery, anterior and right middle cerebral artery and branches.  There is proximal truncation of the left middle cerebral artery and  marked paucity of vasculature throughout the left MCA distribution.       Impression:       Truncated left middle cerebral artery, and marked paucity of  left MCA territory vascularity.     D:  07/19/2017  E:  07/19/2017     This report was finalized on 7/19/2017 11:50 PM by DR. Dayne Horton MD.       CT Head Without Contrast [[id number]] Collected:  07/20/17 0948     Updated:  07/20/17 1013    Narrative:       EXAMINATION: CT HEAD WO CONTRAST-07/20/2017:      INDICATION: AMS; I63.512-Cerebral infarction due to unspecified  occlusion or stenosis of left middle cerebral artery; R40.2431-Jenny  coma scale score 3-8, in the field (emt or ambulance); I10-Essential  (primary) hypertension.         TECHNIQUE:  CT data set of the brain was performed without intravenous  contrast.     The radiation dose reduction device was turned on for each scan per the  ALARA (As Low as Reasonably Achievable) protocol.     COMPARISON: Compared to previous CT data sets of the brain yesterday,  07/19/2017.     FINDINGS:   1. There is evidence of an old infarct in the right cerebral hemisphere  with encephalomalacia and acquired porencephaly. In addition, there is  abnormal low attenuation adjacent to the encephalomalacia of  indeterminate age. These findings are stable from yesterday.  2. There is evolving infarct in the left cerebral hemisphere with low  attenuation.  3. There is increased attenuation in the white matter on the left  involving left periventricular region and around the evolving infarct in  the right hemisphere. This is more likely contrast stain rather than  bilateral diffuse hemorrhage.  4. There is circulating contrast in the basal vessels of the brain.  5. There is mass effect on the left with edema and midline shift left  to  right.       Impression:       1.  Slight increase in the edema in the left hemisphere with mass effect  and slight increased left to right midline shift.  2.  Bilateral ischemic insults.  3.  Evidence of increased attenuation in the left periventricular white  matter and around the rightward convexity and hemispheric gray matter  for which contrast stain is favored over bilateral hemorrhage, although  in the absence of an interventional selective contrast injection  radiographically, bilateral diffuse hemorrhage cannot be excluded in  this case. These are definitely new densities in the brain bilaterally  and should be carefully followed.     D:  07/20/2017  E:  07/20/2017           This report was finalized on 7/20/2017 10:11 AM by Dr. Raphael North MD.           Discharge Disposition  Hospice/Medical Facility ([facility])    Discharge Medications   RecioDiana   Home Medication Instructions YUDY:[id number]    Printed on:07/20/17 1436   Medication Information                      acetaminophen (TYLENOL) 500 MG tablet  Take 500 mg by mouth Every 6 (Six) Hours As Needed for Mild Pain .             atorvastatin (LIPITOR) 40 MG tablet  Take 40 mg by mouth Every Night.             Benzalkonium Chloride (REMEDY ANTIMICROBIAL CLEANSER) 0.12 % liquid  Apply  topically. Use for dorita care after each incontinent episode and as needed             bisacodyl (BISACODYL LAXATIVE) 10 MG suppository  Insert 10 mg into the rectum 1 (One) Time As Needed for Constipation.             busPIRone (BUSPAR) 2.5 MG half tablet  Take 2.5 mg by mouth Every Evening.             busPIRone (BUSPAR) 5 MG tablet  Take 5 mg by mouth Every Morning.             dimethicone (REMEDY NUTRASHIELD) 1 % cream  Apply  topically. Apply topically to buttocks/dorita area every shift as needed             famotidine (PEPCID) 20 MG tablet  Take 20 mg by mouth Every Night.             ipratropium-albuterol (DUO-NEB) 0.5-2.5 mg/mL  nebulizer  Take 3 mL by nebulization Every 6 (Six) Hours As Needed for Wheezing.             levothyroxine (SYNTHROID, LEVOTHROID) 175 MCG tablet  Take 175 mcg by mouth Every Morning Before Breakfast.             lisinopril (PRINIVIL,ZESTRIL) 10 MG tablet  Take 10 mg by mouth 2 (Two) Times a Day.             melatonin 3 MG tablet  Take 1.5 mg by mouth At Night As Needed for Sleep.             Memantine HCl-Donepezil HCl (NAMZARIC) 14-10 MG capsule sustained-release 24 hr  Take 1 capsule by mouth Every Night.             Menthol-Zinc Oxide (REMEDY CALAZIME) 0.2-20 % paste  Apply  topically. Apply topically to coccyx area every shift and as needed for protection             Multiple Vitamins-Minerals (MULTIVITAMINS/MINERALS ADULT PO)  Take 1 tablet by mouth Daily.             ondansetron (ZOFRAN) 4 MG tablet  Take 4 mg by mouth Every 6 (Six) Hours As Needed for Nausea or Vomiting.             potassium chloride (KLOR-CON SPRINKLE) 10 MEQ CR capsule  Take 20 mEq by mouth Daily.             sotalol (BETAPACE) 40 MG half tablet  Take 40 mg by mouth 2 (Two) Times a Day.                 Discharge Diet: As tolerated      Activity at Discharge: As tolerated      Follow-up Appointments  No future appointments.      Test Results Pending at Discharge      Time: Discharge 40 min

## 2017-07-20 NOTE — PROGRESS NOTES
Continued Stay Note   [facility]     Patient Name: Diana Recio  MRN: [id number]  Today's Date: 7/20/2017    Admit Date: 7/19/2017          Discharge Plan       07/20/17 0918    Case Management/Social Work Plan    Additional Comments Patient presents to [facility] with acute left MCA ischemic stroke. Family wishes for patient to be full comfort. No intervention will be done. Palliative care has been consulted and hospice consult will likely be appropriate. Case management will follow and assist as needed.              Discharge Codes     None            Brian Hernadez

## 2017-07-20 NOTE — PROGRESS NOTES
"      HOSPITALIST DAILY PROGRESS NOTE    Chief Complaint: AMS    Subjective   SUBJECTIVE/OVERNIGHT EVENTS   Patient very difficult to arouse this am. No family present.     Review of Systems:  Gen-no fevers, no chills  CV-no chest pain, no palpitations  Resp-no cough, no dyspnea  GI-no N/V/D, no abd pain    Objective   OBJECTIVE   I have reviewed the vital signs.  /86 (BP Location: Right arm, Patient Position: Lying)  Pulse 93  Temp 97.9 °F (36.6 °C) (Oral)   Resp 16  Ht 65\" (165.1 cm)  Wt 113 lb 11.2 oz (51.6 kg)  SpO2 96%  BMI 18.92 kg/m2    Physical Exam:  Gen-mild distress, restless, unable to arouse  CV-RRR, S1 S2 normal, no m/r/g  Resp-CTAB, no wheezes  Abd-soft, NT, ND, +BS  Ext-no edema  Neuro-Somnolent, right pupil > left, will not follow commands.  Psych-largely unresponsive    Results:  I have reviewed the labs, culture data, radiology results, and diagnostic studies.      Results from last 7 days  Lab Units 07/20/17  0403 07/19/17  1246 07/19/17  1244   WBC 10*3/mm3 9.16  --  7.64   HEMOGLOBIN g/dL 11.8  --  12.8   HEMOGLOBIN, POC g/dL  --  13.6  --    HEMATOCRIT % 36.2  --  39.6   HEMATOCRIT POC %  --  40  --    PLATELETS 10*3/mm3 293  --  336       Results from last 7 days  Lab Units 07/20/17  0403   SODIUM mmol/L 143   POTASSIUM mmol/L 4.9   CHLORIDE mmol/L 112*   CO2 mmol/L 24.0   BUN mg/dL 27*   CREATININE mg/dL 1.40*   GLUCOSE mg/dL 135*   CALCIUM mg/dL 9.7     Radiology Results: CT head personally reviewed with expanding CVA with more midline shift. Agree with interpretation.  Imaging Results (last 24 hours)     Procedure Component Value Units Date/Time    XR Chest 1 View [[id number]] Collected:  07/19/17 1651     Updated:  07/19/17 2235    Narrative:       EXAMINATION: XR CHEST 1 VIEW-07/19/2017:      INDICATION: Acute Stroke Protocol (onset < 12 hrs).      COMPARISON: Portable chest 01/30/2015.     FINDINGS: Heart shadow is mildly enlarged and the vasculature is  cephalized. There is " no evidence of overt pulmonary edema. Coarsening of  the interstitial lung markings in the left base is similar to the prior  study. No densely consolidated lung effusion or pneumothorax is seen.             Impression:       Mild pulmonary vascular congestion, with minimal if any  interstitial edema.  No clearly new chest disease is seen.     D:  07/19/2017  E:  07/19/2017     This report was finalized on 7/19/2017 10:33 PM by DR. Dayne Horton MD.       CT Head Without Contrast Stroke Protocol [[id number]] Collected:  07/19/17 1630     Updated:  07/19/17 2316    Narrative:       EXAMINATION: CT HEAD WO CONTRAST, STROKE PROTOCOL-07/19/2017:      INDICATION: Acute Stroke Protocol (onset < 12 hrs).         TECHNIQUE: 5 mm unenhanced images through the brain.     The radiation dose reduction device was turned on for each scan per the  ALARA (As Low as Reasonably Achievable) protocol.     COMPARISON: 01/28/2015 head CT scan.     FINDINGS: There are expected extensive changes of encephalomalacia due  to evolution of patient's previously noted right MCA infarct. No acute  changes are seen in the right cerebral hemisphere.     There is, however, early evidence of massive left MCA infarct, with  diffuse low attenuation change visible throughout practically the entire  left MCA vascular distribution. There is a very dense left MCA sign  consistent with thrombus.     There is no evidence of hemorrhage. Additional note is made of old left  cerebellar infarct. Ventricles are normal in size for the degree of  atrophy present. No abnormal extra-axial collection is seen.       Impression:       1.  Extensive old right MCA territory infarct and old left cerebellar  infarct.  2.  Early changes of massive acute left MCA infarct, with hyperdense  left MCA sign.     NOTE: The exam time is shown as 12:34. The study was reviewed on the CT  scan table and discussed with Dr. Barragan at 12:37.     D:  07/19/2017  E:  07/19/2017           This  report was finalized on 7/19/2017 11:13 PM by DR. Dayne Horton MD.       CT Cerebral Perfusion With & Without Contrast [[id number]] Collected:  07/19/17 1305     Updated:  07/19/17 2350    Narrative:       EXAMINATION: CT CEREBRAL PERFUSION W WO CONTRAST-      INDICATION: Left sided deficits.      TECHNIQUE: Cerebral perfusion analysis was performed using computed  tomography with contrast administration including postprocessing of  parametric maps with determination of cerebral blood flow, cerebral  blood volume, and mean transit time.     The radiation dose reduction device was turned on for each scan per the  ALARA (As Low as Reasonably Achievable) protocol.     COMPARISON: Unenhanced head CT scan of same date.     FINDINGS: Decreased cerebral blood flow and cerebral blood volume are  seen corresponding to patient's old right MCA territory infarct.     Today's study shows markedly increased mean transit time and time to  drain throughout the anterior left middle cerebral artery territory.  Cerebral blood flow images show similarly, markedly decreased blood flow  essentially throughout the transitional left MCA territory distribution.  Cerebral blood volume images appear to show most of this area as infarct  with relatively little ischemic penumbra.       Impression:       1. Old right MCA territory infarct.  2. Large acute left MCA territory infarct. Little evidence to suggest  significant recoverable tissue.     D:  07/19/2017  E:  07/19/2017     This report was finalized on 7/19/2017 11:48 PM by DR. Dayne Horton MD.       CT Angiogram Neck With & Without Contrast [[id number]] Collected:  07/19/17 1411     Updated:  07/19/17 2351    Narrative:       EXAMINATION: CT ANGIOGRAM NECK W WO CONTRAST-      INDICATION: Left sided deficits.      TECHNIQUE: 0.75 mm pre and postcontrast images through the neck with 2-D  angiographic reconstructions of the carotid arteries and vertebral  arteries.     The radiation dose reduction  device was turned on for each scan per the  ALARA (As Low as Reasonably Achievable) protocol.     COMPARISON: None.     FINDINGS: Patient history indicates left-sided deficit. The  brachiocephalic vessels appear to arise normally from the heavily  calcified aortic arch. Proximal subclavian arteries appear grossly  normal. On the right, no significant common, internal or external  carotid artery stenosis is seen.     On the left, there is some calcification of the margin of the left  carotid bulb but no significant common, internal or external carotid  artery stenosis is appreciated. Vertebral arteries appear to be  codominant and normal.       Impression:       No evidence of hemodynamically significant carotid or  vertebral stenosis in the neck.     D:  07/19/2017  E:  07/19/2017     This report was finalized on 7/19/2017 11:49 PM by DR. Dayne Horton MD.       CT Angiogram Head With & Without Contrast [[id number]] Collected:  07/19/17 1401     Updated:  07/19/17 1152    Narrative:       EXAMINATION: CT ANGIOGRAM HEAD W WO CONTRAST-      INDICATION: Left sided deficits.     TECHNIQUE: Spiral acquisition 0.75 mm pre and postcontrast images  through the brain with 2-D multiplanar angiographic reconstructions of  the major intracranial arteries.     The radiation dose reduction device was turned on for each scan per the  ALARA (As Low as Reasonably Achievable) protocol.     COMPARISON: Unenhanced head CT scan and cerebral perfusion exam of same  date.     FINDINGS: Note is made of patient's old right MCA infarct. There is  increasingly dense edema throughout the entire left MCA territory.     Perfusion study shows normal appearing distal vertebral arteries,  basilar artery, anterior and right middle cerebral artery and branches.  There is proximal truncation of the left middle cerebral artery and  marked paucity of vasculature throughout the left MCA distribution.       Impression:       Truncated left middle cerebral  artery, and marked paucity of  left MCA territory vascularity.     D:  07/19/2017  E:  07/19/2017     This report was finalized on 7/19/2017 11:50 PM by DR. Dayne Horton MD.       CT Head Without Contrast [[id number]] Collected:  07/20/17 0948     Updated:  07/20/17 1013    Narrative:       EXAMINATION: CT HEAD WO CONTRAST-07/20/2017:      INDICATION: AMS; I63.512-Cerebral infarction due to unspecified  occlusion or stenosis of left middle cerebral artery; R40.2431-[place]  coma scale score 3-8, in the field (emt or ambulance); I10-Essential  (primary) hypertension.         TECHNIQUE:  CT data set of the brain was performed without intravenous  contrast.     The radiation dose reduction device was turned on for each scan per the  ALARA (As Low as Reasonably Achievable) protocol.     COMPARISON: Compared to previous CT data sets of the brain yesterday,  07/19/2017.     FINDINGS:   1. There is evidence of an old infarct in the right cerebral hemisphere  with encephalomalacia and acquired porencephaly. In addition, there is  abnormal low attenuation adjacent to the encephalomalacia of  indeterminate age. These findings are stable from yesterday.  2. There is evolving infarct in the left cerebral hemisphere with low  attenuation.  3. There is increased attenuation in the white matter on the left  involving left periventricular region and around the evolving infarct in  the right hemisphere. This is more likely contrast stain rather than  bilateral diffuse hemorrhage.  4. There is circulating contrast in the basal vessels of the brain.  5. There is mass effect on the left with edema and midline shift left to  right.       Impression:       1.  Slight increase in the edema in the left hemisphere with mass effect  and slight increased left to right midline shift.  2.  Bilateral ischemic insults.  3.  Evidence of increased attenuation in the left periventricular white  matter and around the rightward convexity and hemispheric  gray matter  for which contrast stain is favored over bilateral hemorrhage, although  in the absence of an interventional selective contrast injection  radiographically, bilateral diffuse hemorrhage cannot be excluded in  this case. These are definitely new densities in the brain bilaterally  and should be carefully followed.     D:  07/20/2017  E:  07/20/2017           This report was finalized on 7/20/2017 10:11 AM by Dr. Raphael North MD.             I have reviewed the medications.      Assessment/Plan   ASSESSMENT/PLAN    Principal Problem:    Acute ischemic left MCA stroke  Active Problems:    Dementia    Chronic kidney disease, stage III (moderate)    Atrial fibrillation    89 year old female admitted from home with large left sided MCA infarct.    Plan:  --Will continue to palliative measures per family wishes. Her CVA is expanding with edema and midline shift based on imaging and she is probably hospice appropriate at this point.  --Other medical problems stable at this point.    Eliza Barajas II, DO  07/20/17  11:49 AM

## 2017-07-20 NOTE — NURSING NOTE
Pt seen for skin care concerns and comfort needs. Pt is laying in bed comfortably with eyes closed. No specialty bed ordered a this time. No family in room and pt is a comfort measures/ AND. TRISHA Cee notified to call if family would like a specialty bed for pt. Recs given for skin care and moisture barrier cream to coccyx BID. WOCN will s/o for now, but please reconsult for any further needs or concerns.

## 2017-07-21 NOTE — PROGRESS NOTES
Hospice Progress Note    Patient Name:  Diana Recio   : 1925   Sex: female     Patient Care Team:  Silvestre Coleman MD as PCP - General (Hospitalist)     Code Status: Comfort measures    Subjective:    Pt resting in bed, turned to Left side. Appears comfortable. Mouth breathing, audible congestion, which is new from yesterday.     No family present.     No prns overnight    ROS:  Review of Systems   Unable to perform ROS: Patient unresponsive       Reviewed current scheduled and prn medications for route, type, dose and frequency.     •  acetaminophen  •  bisacodyl  •  glycopyrrolate  •  LORazepam  •  LORazepam  •  Morphine  •  sodium chloride  •  sodium chloride    Objective:   /67 (BP Location: Right arm, Patient Position: Lying)  Pulse 81  Temp 98.1 °F (36.7 °C) (Axillary)   Resp 22   Intake & Output (last day)     None        Lab Results (last 24 hours)     Procedure Component Value Units Date/Time    POC Glucose Fingerstick [[id number]]  (Abnormal) Collected:  17    Specimen:  Blood Updated:  17     Glucose 158 (H) mg/dL     Narrative:       Meter: [id number] : [id number] Jerrica Caicedo        Imaging Results (last 24 hours)     ** No results found for the last 24 hours. **          PPS: Palliative Performance Scale score as of 2017, 10:49 AM is 10% based on the following measures:   Ambulation: Totally bed bound  Activity and Evidence of Disease: Unable to do any work, extensive evidence of disease  Self-Care: Total care  Intake:  Mouth care only  LOC: Drowsy or coma    Physical Exam:  Physical Exam   Constitutional: No distress.   HENT:   Head: Normocephalic.   Eyes:   Eyes remained closed   Cardiovascular: Normal rate.    Pulmonary/Chest: No respiratory distress. She has no wheezes.   tachypneic   Abdominal: Soft. Bowel sounds are normal. She exhibits no distension.   Musculoskeletal: She exhibits deformity.   Neurological: She is unresponsive.   Does not  follow commands   Skin: She is not diaphoretic. No erythema. There is pallor.   Psychiatric:   Unable to asses       Principal Problem:    Acute ischemic left MCA stroke  Active Problems:    Dementia    Chronic kidney disease, stage III (moderate)    Atrial fibrillation    CVA (cerebral vascular accident)      Assessment/Plan:     - Increased frequency of scheduled robinul -- may be able to decrease frequency tomorrow since patch will be placed today and go titrate up from there -- all tracheal so lasix not given    - scopolamine patch  - discontinue blood glucose checks    Total Visit Time: 15 min  Face to Face Time: 5min    Justification for care:  Patient meets criteria for acute in-patient care with required nursing assessment and interventions for symptoms with IV medications.    Nadine Aguayo, FRANCIA  (C) [phone]  (O) [phone]  07/21/17  10:47 AM

## 2017-07-21 NOTE — SIGNIFICANT NOTE
1300 Palliative Nurse  Dr. Minerva Nance, APRN  Nadine Aguyao, APRN  Ritika Newell,   Jacqueline Hernandez, RN, PN  Pilar Juarez, [facility]  Neisha Curiel, Hospice CM/Nurse

## 2017-07-21 NOTE — PLAN OF CARE
Problem: Patient Care Overview (Adult)  Goal: Plan of Care Review  Outcome: Ongoing (interventions implemented as appropriate)    07/20/17 1530   Coping/Psychosocial Response Interventions   Plan Of Care Reviewed With daughter;son   Outcome Evaluation   Outcome Summary/Follow up Plan Patient dying, CVA increased in size, son request to transition to in-patient hospice. Hospice met with him and will transition today

## 2017-07-21 NOTE — PLAN OF CARE
Problem: Patient Care Overview (Adult)  Goal: Plan of Care Review  Outcome: Ongoing (interventions implemented as appropriate)    07/21/17 0421   Coping/Psychosocial Response Interventions   Plan Of Care Reviewed With son   Patient Care Overview   Progress unable to show any progress toward functional goals         Problem: Dying Patient, Actively (Adult)  Goal: Comfort/Pain Control  Outcome: Ongoing (interventions implemented as appropriate)

## 2017-07-22 NOTE — PROGRESS NOTES
Hospice Progress Note    Code Status: Comfort Measures and Allow Natural Death (A-N-D)     Subjective   S: Medical record reviewed, follow up visit for sx mgmt. Events noted. Pt unresponsive. No family present. Did not require any PRNs yesterday and appears comfortable upon assessment today. Have gospel music playing in the room for her.     ROS:   Negative except as above.    PMH/PSH/PFH: Reviewed, no changes    No Known Allergies    Current Facility-Administered Medications   Medication Dose Route Frequency Provider Last Rate Last Dose   • acetaminophen (TYLENOL) suppository 650 mg  650 mg Rectal Q4H PRN Eliza M Jenn II, DO       • artificial tears (LUBRIFRESH P.M.) ophthalmic ointment   Topical TID Eliza M Jenn II, DO       • bisacodyl (DULCOLAX) suppository 10 mg  10 mg Rectal Daily PRN FRANCIA Haro       • glycopyrrolate (ROBINUL) injection 0.4 mg  0.4 mg Intravenous Q6H PRN Eliza M Jenn II, DO       • glycopyrrolate (ROBINUL) injection 0.4 mg  0.4 mg Intravenous Q4H FRANCIA Haro   0.4 mg at 07/22/17 1756   • LORazepam (ATIVAN) injection 0.5 mg  0.5 mg Intravenous Q4H PRN Eliza M Jenn II, DO       • LORazepam (ATIVAN) injection 2 mg  2 mg Intravenous Q15 Min PRN Eliza M Jenn II, DO       • Morphine sulfate (PF) injection 2 mg  2 mg Intravenous Q2H PRN Eliza M Jenn II, DO       • palliative care oral rinse   Mouth/Throat BID FRANCIA Haro       • Scopolamine (TRANSDERM-SCOP) 1.5 MG/3DAYS patch 1 patch  1 patch Transdermal Q72H FRANCIA Haro   1 patch at 07/21/17 1237   • sodium chloride 0.9 % flush 1-10 mL  1-10 mL Intravenous PRN Eliza M Jenn II, DO       • sodium chloride 0.9 % flush 10 mL  10 mL Intravenous PRN Eliza M Jenn II, DO         Infusions:       Current medication reviewed for route, type, dose and frequency and are current per MAR at time of dictation.    Objective   /67 (BP Location: Right arm, Patient Position: Lying)  Pulse 81   Temp 98.1 °F (36.7 °C) (Axillary)   Resp 22    Intake/Output Summary (Last 24 hours) at 07/22/17 1844  Last data filed at 07/21/17 2310   Gross per 24 hour   Intake                0 ml   Output              650 ml   Net             -650 ml       PPS: 10%  Physical Examination:   General Appearance:    Chronically ill appearing, unresponsive, NAD   HEENT:    NC/AT, without obvious abnormality, eyes remain partially open, mouth remains open    Neck:   supple, trachea midline, no JVD   Lungs:     Decreased BS with faint upper respiratory secretions    Heart:    RRR, normal S1 and S2, no M/R/G   Abdomen:     Soft, NT, ND, hypoactive BS    Extremities:   no edema   Pulses:   Pulses palpable and equal bilaterally   Skin:   Warm, dry   Neurologic:   Unresponsive   Psych:   Calm, appropriate         Labs/Diagnostics/Clinical Data: Reviewed.    Assessment/Plan    Patient Active Problem List   Diagnosis   • Acute ischemic left MCA stroke   • Dementia   • Chronic kidney disease, stage III (moderate)   • Atrial fibrillation   • CVA (cerebral vascular accident)       Assessment:   Diana Recio is a 89 y.o. yo female with CVA    Goals of Care: To remain comfortable  Family/Support/Spiritual: No family at bedside.      Plan:     Continue current regimen. Pt appears comfortable at present. Secretions improved with scop patch and scheduled robinul. No PRNs required yesterday.     Justification: Patient continues to meet criteria for Acute In-patient Care due to inability to make needs known requiring frequent RN assessment to assure comfort through end of life.    Time: 10 mins    Viktoriya Palma MD  7/22/2017

## 2017-07-22 NOTE — PROGRESS NOTES
Continued Stay Note  [facility]     Patient Name: Diana Recio  MRN: [id number]  Today's Date: 7/22/2017    Admit Date: 7/20/2017          Discharge Plan       07/22/17 0940    Case Management/Social Work Plan    Plan inpatient hospice note    Additional Comments Chart reviewed. Pt minimally responsive.  Pt opened eyes to stimuli but does not follow commands.  Oral care provided.  Pt appears comfortable.  Face and body are relaxed.  Breathing is regular, even, and unlabored.  Family not at bedside.  Scopolamine patch added yesterday and robinul frequency increased to every 4 hours.  Patient's secretions better.  Patient remains inpatient appropriate due to her inability to voice needs requiring skilled nursing assessments and interventions including the use of IV/SC medications to promote comfort.  Please call [phone] for hospice RN. Hospice RN available daily 8-5. Physician available 24/7.  Contact numbers on front of chartlet as well.                Discharge Codes     None            Stefanie Mitchell RN

## 2017-07-23 NOTE — PROGRESS NOTES
Continued Stay Note   [facility]     Patient Name: Diana Recio  MRN: [id number]  Today's Date: 7/23/2017    Admit Date: 7/20/2017          Discharge Plan       07/23/17 0950    Case Management/Social Work Plan    Plan inpatient hospice note    Additional Comments chart reviewed. Patient unresponsive.  Appears comfortable.  Face and body relaxed.  Breathing regular, even, and unlabored. Patient's son at bedside. Education provided regarding disease progression, s/s of imminent death, and assessment findings.  Please call [phone] for any issues/concerns/family requests.              Discharge Codes     None            Stefanie Mitchell RN

## 2017-07-23 NOTE — PROGRESS NOTES
Hospice Progress Note    Code Status: Comfort Measures and Allow Natural Death (A-N-D)     Subjective   S: Medical record reviewed, follow up visit for sx mgmt. Events noted. Pt unresponsive. No family present. Did not require any PRNs yesterday and appears comfortable upon assessment today. Have gospel music playing in the room for her.     ROS:   Negative except as above.    PMH/PSH/PFH: Reviewed, no changes    No Known Allergies    Current Facility-Administered Medications   Medication Dose Route Frequency Provider Last Rate Last Dose   • acetaminophen (TYLENOL) suppository 650 mg  650 mg Rectal Q4H PRN Eliza M Jenn II, DO       • artificial tears (LUBRIFRESH P.M.) ophthalmic ointment   Topical TID Eliza M Jenn II, DO   1 application at 07/23/17 1620   • bisacodyl (DULCOLAX) suppository 10 mg  10 mg Rectal Daily PRN FRANCIA Haro       • glycopyrrolate (ROBINUL) injection 0.4 mg  0.4 mg Intravenous Q6H PRN Eliza  Jenn II, DO       • glycopyrrolate (ROBINUL) injection 0.4 mg  0.4 mg Intravenous Q4H FRANCIA Haro   0.4 mg at 07/23/17 1620   • LORazepam (ATIVAN) injection 0.5 mg  0.5 mg Intravenous Q4H PRN Eliza M Jenn II, DO       • LORazepam (ATIVAN) injection 2 mg  2 mg Intravenous Q15 Min PRN Eliza M Jenn II, DO       • Morphine sulfate (PF) injection 2 mg  2 mg Intravenous Q2H PRN Eliza  Jenn II, DO       • palliative care oral rinse   Mouth/Throat BID FRANCIA Haro   3 mL at 07/23/17 1711   • Scopolamine (TRANSDERM-SCOP) 1.5 MG/3DAYS patch 1 patch  1 patch Transdermal Q72H FRANCIA Haro   1 patch at 07/21/17 1237   • sodium chloride 0.9 % flush 1-10 mL  1-10 mL Intravenous PRN Eliza M Jenn II, DO       • sodium chloride 0.9 % flush 10 mL  10 mL Intravenous PRN Eliza  Jenn II, DO         Infusions:       Current medication reviewed for route, type, dose and frequency and are current per MAR at time of dictation.    Objective   /67 (BP Location:  Right arm, Patient Position: Lying)  Pulse 81  Temp 98.1 °F (36.7 °C) (Axillary)   Resp 22  No intake or output data in the 24 hours ending 07/23/17 1909    PPS: 10%  Physical Examination:   General Appearance:    Chronically ill appearing, unresponsive, NAD   HEENT:    NC/AT, without obvious abnormality, eyes remain partially open, mouth remains open    Neck:   supple, trachea midline, no JVD   Lungs:     Decreased BS with faint upper respiratory secretions    Heart:    RRR, normal S1 and S2, no M/R/G   Abdomen:     Soft, NT, ND, hypoactive BS    Extremities:   no edema   Pulses:   Pulses palpable and equal bilaterally   Skin:   Warm, dry   Neurologic:   Unresponsive   Psych:   Calm, appropriate         Labs/Diagnostics/Clinical Data: Reviewed.    Assessment/Plan    Patient Active Problem List   Diagnosis   • Acute ischemic left MCA stroke   • Dementia   • Chronic kidney disease, stage III (moderate)   • Atrial fibrillation   • CVA (cerebral vascular accident)       Assessment:   Diana Recio is a 89 y.o. yo female with CVA    Goals of Care: To remain comfortable  Family/Support/Spiritual: No family at bedside.      Plan:     Continue current regimen. Pt appears comfortable at present. Secretions improved with scop patch and scheduled robinul. No PRNs required yesterday.     Add PRN atropine gtt SL.     Justification: Patient continues to meet criteria for Acute In-patient Care due to inability to make needs known requiring frequent RN assessment to assure comfort through end of life.    Time: 10 mins    Viktoriya Palma MD  7/23/2017

## 2017-07-24 NOTE — SIGNIFICANT NOTE
Exam confirms by auscultation no audible heart heart tones, no breath sounds. Death Pronounced at 06:23 am on 7/24/2017    Ty Minor RN/[facility]

## 2017-07-24 NOTE — DISCHARGE SUMMARY
Date of Death:  7/24/2017  Time of Death:  0623    Presenting Problem/History of Present Illness  Principal Problem:    Acute ischemic left MCA stroke  Active Problems:    Dementia    Chronic kidney disease, stage III (moderate)    Atrial fibrillation    CVA (cerebral vascular accident)    Hospital Course  Patient was a 89 y.o. female presented with Left MCA CVA. Pt is a resident of a long term care nursing facility and was brought to the ED after pt found unresponsive; last known normal was previous night. NIH score 34.    Due to edematous progression of CVA causing midline shift, pt's condition continued to deteriorate. Family desired comfort care and pt was transitioned to inpt Hospice services on 7/20/17.     Consults:   Consults     Date and Time Order Name Status Description    7/19/2017 2249 Inpatient Consult to Palliative Care MD Completed     7/19/2017 1640 Inpatient Consult to Neurology Completed     7/19/2017 1232 Consult Interventional Neurologist and/or Stroke Team Completed         Exam confirms by auscultation no audible heart heart tones, no breath sounds. Death Pronounced at 06:23 am on 7/24/2017     Ty Minor RN/FRANCIA Diaz  07/24/17  10:02 AM

## 2017-07-24 NOTE — PROGRESS NOTES
Adult Nutrition  Assessment/PES    Patient Name:  Diana Recio  YOB: 1925  MRN: [id number]  Admit Date:  7/20/2017    Assessment Date:  7/24/2017        Reason for Assessment       07/24/17 0746    Reason for Assessment    Reason For Assessment/Visit NPO/Clr Policy    Time Spent (min) 15    Neurological CVA;Dementia    Condition Other   Pt is comfort measures only, end of life care.                        Nutrition Prescription Ordered       07/24/17 0813    Nutrition Prescription PO    Current PO Diet NPO                Problem/Interventions:        Problem 1       07/24/17 0813    Nutrition Diagnoses Problem 1    Problem 1 No Nutrition Diagnosis at this Time                    Intervention Goal       07/24/17 0814    Intervention Goal    General Hospice Care            Nutrition Intervention       07/24/17 0813    Nutrition Intervention    RD/Tech Action Follow Tx progress;Care plan reviewd              Education/Evaluation       07/24/17 0813    Monitor/Evaluation    Monitor Per protocol        Comments:      Electronically signed by:  Mary Garcia  07/24/17 8:14 AM

## 2024-03-14 NOTE — PROGRESS NOTES
Daily Progress Note  Neurology     LOS: 1 day     Subjective     Chief Complaint: Altered mental status    Interval History:  The patient remained significantly obtunded and poorly responsive    ROS: Negative for fever    Objective     Vital signs in last 24 hours:  Temp:  [97.9 °F (36.6 °C)-98.4 °F (36.9 °C)] 97.9 °F (36.6 °C)  Heart Rate:  [65-93] 93  Resp:  [16] 16  BP: (147-202)/() 147/86      Physical Exam:   General: Lying in bed with eyes closed     Respiratory: Respirations unlabored   CV: RRR       Neurologic Exam:   Mental status: Obtunded   CN: pupils sluggish                     Results from last 7 days  Lab Units 07/20/17  0403   WBC 10*3/mm3 9.16   HEMOGLOBIN g/dL 11.8   HEMATOCRIT % 36.2   PLATELETS 10*3/mm3 293           Results Review:  Labs reviewed  Repeat CT head personally reviewed.  There are signs of cerebral edema on the left hemisphere with sulcal effacement in [place] with some rightward midline shift    Assessment/Plan     Principal Problem:    Acute ischemic left MCA stroke  Active Problems:    Dementia    Chronic kidney disease, stage III (moderate)    Atrial fibrillation        1.  Acute left MCA ischemic stroke = Patient on comfort measures. Palliative care and hospice following.       Neuro will follow as needed.        Eliza Lambert MD  07/20/17  1:47 PM           Outcome  Approved today  PA Case: [id number], Status: Approved, Coverage Starts on: 1/1/2024 12:00:00 AM, Coverage Ends on: 12/31/2024 12:00:00 AM.